# Patient Record
Sex: FEMALE | Race: WHITE | NOT HISPANIC OR LATINO | Employment: OTHER | URBAN - METROPOLITAN AREA
[De-identification: names, ages, dates, MRNs, and addresses within clinical notes are randomized per-mention and may not be internally consistent; named-entity substitution may affect disease eponyms.]

---

## 2017-05-15 ENCOUNTER — HISTORICAL (OUTPATIENT)
Dept: LAB | Facility: HOSPITAL | Age: 77
End: 2017-05-15

## 2017-05-15 LAB
ALBUMIN SERPL-MCNC: 3.6 GM/DL (ref 3.4–5)
ALBUMIN/GLOB SERPL: 0.9 RATIO (ref 1.1–2)
ALP SERPL-CCNC: 127 UNIT/L (ref 46–116)
ALT SERPL-CCNC: 26 UNIT/L (ref 12–78)
APPEARANCE, UA: CLEAR
AST SERPL-CCNC: 16 UNIT/L (ref 15–37)
BACTERIA #/AREA URNS AUTO: ABNORMAL /HPF
BILIRUB SERPL-MCNC: 0.5 MG/DL (ref 0.2–1)
BILIRUB UR QL STRIP: NEGATIVE
BILIRUBIN DIRECT+TOT PNL SERPL-MCNC: 0.13 MG/DL (ref 0–0.2)
BILIRUBIN DIRECT+TOT PNL SERPL-MCNC: 0.37 MG/DL (ref 0–0.8)
BUN SERPL-MCNC: 14 MG/DL (ref 7–18)
CALCIUM SERPL-MCNC: 8.9 MG/DL (ref 8.5–10.1)
CHLORIDE SERPL-SCNC: 105 MMOL/L (ref 98–107)
CHOLEST SERPL-MCNC: 226 MG/DL (ref 0–200)
CHOLEST/HDLC SERPL: 3.4 {RATIO} (ref 0–4)
CO2 SERPL-SCNC: 29.4 MMOL/L (ref 21–32)
COLOR UR: YELLOW
CREAT SERPL-MCNC: 0.68 MG/DL (ref 0.6–1.3)
ERYTHROCYTE [DISTWIDTH] IN BLOOD BY AUTOMATED COUNT: 15.1 % (ref 11.5–17)
GLOBULIN SER-MCNC: 4 GM/DL (ref 2.4–3.5)
GLUCOSE (UA): NEGATIVE
GLUCOSE SERPL-MCNC: 101 MG/DL (ref 74–106)
HCT VFR BLD AUTO: 40.8 % (ref 37–47)
HDLC SERPL-MCNC: 67 MG/DL (ref 40–60)
HGB BLD-MCNC: 13.4 GM/DL (ref 12–16)
HGB UR QL STRIP: NEGATIVE
KETONES UR QL STRIP: NEGATIVE
LDLC SERPL CALC-MCNC: 142 MG/DL (ref 0–129)
LEUKOCYTE ESTERASE UR QL STRIP: ABNORMAL
MCH RBC QN AUTO: 29 PG (ref 27–31)
MCHC RBC AUTO-ENTMCNC: 32.8 GM/DL (ref 33–36)
MCV RBC AUTO: 88.4 FL (ref 80–94)
NITRITE UR QL STRIP.AUTO: POSITIVE
PH UR STRIP: 8.5 [PH] (ref 5–9)
PLATELET # BLD AUTO: 402 X10(3)/MCL (ref 130–400)
PMV BLD AUTO: 7.9 FL (ref 7.4–10.4)
POTASSIUM SERPL-SCNC: 5 MMOL/L (ref 3.5–5.1)
PROT SERPL-MCNC: 7.6 GM/DL (ref 6.4–8.2)
PROT UR QL STRIP: NEGATIVE
RBC # BLD AUTO: 4.62 X10(6)/MCL (ref 4.2–5.4)
RBC #/AREA URNS HPF: ABNORMAL /[HPF]
SODIUM SERPL-SCNC: 141 MMOL/L (ref 136–145)
SP GR UR STRIP: 1.02 (ref 1–1.03)
SQUAMOUS EPITHELIAL, UA: ABNORMAL
TRIGL SERPL-MCNC: 83 MG/DL
TSH SERPL-ACNC: 1 MIU/ML (ref 0.36–3.74)
UROBILINOGEN UR STRIP-ACNC: 1
VLDLC SERPL CALC-MCNC: 17 MG/DL
WBC # SPEC AUTO: 11.8 X10(3)/MCL (ref 4.5–11.5)
WBC #/AREA URNS AUTO: ABNORMAL /HPF

## 2018-03-01 ENCOUNTER — HISTORICAL (OUTPATIENT)
Dept: RADIOLOGY | Facility: HOSPITAL | Age: 78
End: 2018-03-01

## 2018-03-01 LAB
ALBUMIN SERPL-MCNC: 3.3 GM/DL (ref 3.4–5)
ALBUMIN/GLOB SERPL: 0.8 RATIO (ref 1.1–2)
ALP SERPL-CCNC: 138 UNIT/L (ref 46–116)
ALT SERPL-CCNC: 24 UNIT/L (ref 12–78)
APPEARANCE, UA: ABNORMAL
AST SERPL-CCNC: 17 UNIT/L (ref 15–37)
BACTERIA #/AREA URNS AUTO: ABNORMAL /HPF
BILIRUB SERPL-MCNC: 0.5 MG/DL (ref 0.2–1)
BILIRUB UR QL STRIP: NEGATIVE
BILIRUBIN DIRECT+TOT PNL SERPL-MCNC: 0.15 MG/DL (ref 0–0.2)
BILIRUBIN DIRECT+TOT PNL SERPL-MCNC: 0.38 MG/DL (ref 0–0.8)
BUN SERPL-MCNC: 14.7 MG/DL (ref 7–18)
CALCIUM SERPL-MCNC: 9.1 MG/DL (ref 8.5–10.1)
CHLORIDE SERPL-SCNC: 105 MMOL/L (ref 98–107)
CHOLEST SERPL-MCNC: 157 MG/DL (ref 0–200)
CHOLEST/HDLC SERPL: 2.3 {RATIO} (ref 0–4)
CO2 SERPL-SCNC: 27.6 MMOL/L (ref 21–32)
COLOR UR: YELLOW
CREAT SERPL-MCNC: 0.71 MG/DL (ref 0.6–1.3)
GLOBULIN SER-MCNC: 3.9 GM/DL (ref 2.4–3.5)
GLUCOSE (UA): NEGATIVE
GLUCOSE SERPL-MCNC: 97 MG/DL (ref 74–106)
HDLC SERPL-MCNC: 68 MG/DL (ref 40–60)
HGB UR QL STRIP: ABNORMAL
KETONES UR QL STRIP: NEGATIVE
LDLC SERPL CALC-MCNC: 76 MG/DL (ref 0–129)
LEUKOCYTE ESTERASE UR QL STRIP: ABNORMAL
NITRITE UR QL STRIP.AUTO: POSITIVE
PH UR STRIP: 6 [PH] (ref 5–9)
POTASSIUM SERPL-SCNC: 3.9 MMOL/L (ref 3.5–5.1)
PROT SERPL-MCNC: 7.2 GM/DL (ref 6.4–8.2)
PROT UR QL STRIP: NEGATIVE
RBC #/AREA URNS HPF: ABNORMAL /[HPF]
SODIUM SERPL-SCNC: 142 MMOL/L (ref 136–145)
SP GR UR STRIP: 1.02 (ref 1–1.03)
SQUAMOUS EPITHELIAL, UA: ABNORMAL
TRIGL SERPL-MCNC: 66 MG/DL
UROBILINOGEN UR STRIP-ACNC: 1
VLDLC SERPL CALC-MCNC: 13 MG/DL
WBC #/AREA URNS AUTO: ABNORMAL /HPF

## 2018-04-17 ENCOUNTER — HISTORICAL (OUTPATIENT)
Dept: LAB | Facility: HOSPITAL | Age: 78
End: 2018-04-17

## 2018-04-17 LAB
APPEARANCE, UA: ABNORMAL
BACTERIA #/AREA URNS AUTO: ABNORMAL /HPF
BILIRUB UR QL STRIP: NEGATIVE
COLOR UR: YELLOW
GLUCOSE (UA): NEGATIVE
HGB UR QL STRIP: NEGATIVE
KETONES UR QL STRIP: NEGATIVE
LEUKOCYTE ESTERASE UR QL STRIP: NEGATIVE
NITRITE UR QL STRIP.AUTO: NEGATIVE
PH UR STRIP: 6 [PH] (ref 5–9)
PROT UR QL STRIP: NEGATIVE
RBC #/AREA URNS HPF: ABNORMAL /HPF
SP GR UR STRIP: 1.02 (ref 1–1.03)
SQUAMOUS EPITHELIAL, UA: ABNORMAL
UROBILINOGEN UR STRIP-ACNC: 0.2
WBC #/AREA URNS AUTO: ABNORMAL /HPF

## 2018-05-16 ENCOUNTER — HISTORICAL (OUTPATIENT)
Dept: ADMINISTRATIVE | Facility: HOSPITAL | Age: 78
End: 2018-05-16

## 2018-06-22 ENCOUNTER — HISTORICAL (OUTPATIENT)
Dept: ADMINISTRATIVE | Facility: HOSPITAL | Age: 78
End: 2018-06-22

## 2018-09-04 ENCOUNTER — HISTORICAL (OUTPATIENT)
Dept: LAB | Facility: HOSPITAL | Age: 78
End: 2018-09-04

## 2018-09-04 LAB
ALBUMIN SERPL-MCNC: 3.4 GM/DL (ref 3.4–5)
ALP SERPL-CCNC: 154 UNIT/L (ref 46–116)
ALT SERPL-CCNC: 44 UNIT/L (ref 12–78)
AST SERPL-CCNC: 26 UNIT/L (ref 15–37)
BILIRUB SERPL-MCNC: 0.4 MG/DL (ref 0.2–1)
BILIRUBIN DIRECT+TOT PNL SERPL-MCNC: 0.15 MG/DL (ref 0–0.2)
BILIRUBIN DIRECT+TOT PNL SERPL-MCNC: 0.25 MG/DL (ref 0–0.8)
CHOLEST SERPL-MCNC: 136 MG/DL (ref 0–200)
CHOLEST/HDLC SERPL: 2.5 {RATIO} (ref 0–4)
HDLC SERPL-MCNC: 54 MG/DL (ref 40–60)
LDLC SERPL CALC-MCNC: 59 MG/DL (ref 0–129)
PROT SERPL-MCNC: 7 GM/DL (ref 6.4–8.2)
TRIGL SERPL-MCNC: 114 MG/DL
VLDLC SERPL CALC-MCNC: 23 MG/DL

## 2018-11-09 ENCOUNTER — HOSPITAL ENCOUNTER (OUTPATIENT)
Dept: ADMINISTRATIVE | Facility: HOSPITAL | Age: 78
End: 2018-11-12
Attending: INTERNAL MEDICINE | Admitting: INTERNAL MEDICINE

## 2018-11-10 LAB
ABS NEUT (OLG): 5.92 X10(3)/MCL (ref 2.1–9.2)
ALBUMIN SERPL-MCNC: 3 GM/DL (ref 3.4–5)
ALBUMIN/GLOB SERPL: 0.8 {RATIO}
ALP SERPL-CCNC: 131 UNIT/L (ref 38–126)
ALT SERPL-CCNC: 43 UNIT/L (ref 12–78)
AMPHET UR QL SCN: ABNORMAL
APPEARANCE, UA: CLEAR
APTT PPP: 27.8 SECOND(S) (ref 24.8–36.9)
AST SERPL-CCNC: 46 UNIT/L (ref 15–37)
BACTERIA SPEC CULT: ABNORMAL /HPF
BARBITURATE SCN PRESENT UR: ABNORMAL
BENZODIAZ UR QL SCN: ABNORMAL
BILIRUB SERPL-MCNC: 0.3 MG/DL (ref 0.2–1)
BILIRUB UR QL STRIP: NEGATIVE
BILIRUBIN DIRECT+TOT PNL SERPL-MCNC: 0.1 MG/DL (ref 0–0.5)
BILIRUBIN DIRECT+TOT PNL SERPL-MCNC: 0.2 MG/DL (ref 0–0.8)
BNP BLD-MCNC: 24 PG/ML (ref 0–100)
BUN SERPL-MCNC: 12 MG/DL (ref 7–18)
CALCIUM SERPL-MCNC: 8.2 MG/DL (ref 8.5–10.1)
CANNABINOIDS UR QL SCN: ABNORMAL
CHLORIDE SERPL-SCNC: 106 MMOL/L (ref 98–107)
CK MB SERPL-MCNC: 2.6 NG/ML (ref 0.5–3.6)
CK MB SERPL-MCNC: 3.6 NG/ML (ref 0.5–3.6)
CK SERPL-CCNC: 70 UNIT/L (ref 26–192)
CK SERPL-CCNC: 90 UNIT/L (ref 26–192)
CO2 SERPL-SCNC: 27 MMOL/L (ref 21–32)
COCAINE UR QL SCN: ABNORMAL
COLOR UR: YELLOW
CREAT SERPL-MCNC: 0.78 MG/DL (ref 0.55–1.02)
EOSINOPHIL NFR BLD MANUAL: 16 % (ref 0–8)
ERYTHROCYTE [DISTWIDTH] IN BLOOD BY AUTOMATED COUNT: 13.9 % (ref 11.5–17)
ETHANOL SERPL-MCNC: <3 MG/DL
GLOBULIN SER-MCNC: 4 GM/DL (ref 2.4–3.5)
GLUCOSE (UA): NEGATIVE
GLUCOSE SERPL-MCNC: 103 MG/DL (ref 74–106)
HCT VFR BLD AUTO: 36.4 % (ref 37–47)
HGB BLD-MCNC: 11.4 GM/DL (ref 12–16)
HGB UR QL STRIP: NEGATIVE
INR PPP: 1.02 (ref 0–1.27)
KETONES UR QL STRIP: NEGATIVE
LEUKOCYTE ESTERASE UR QL STRIP: NEGATIVE
LYMPHOCYTES NFR BLD MANUAL: 38 % (ref 13–40)
MAGNESIUM SERPL-MCNC: 1.9 MG/DL (ref 1.8–2.4)
MAGNESIUM SERPL-MCNC: 2 MG/DL (ref 1.8–2.4)
MAGNESIUM SERPL-MCNC: 2 MG/DL (ref 1.8–2.4)
MCH RBC QN AUTO: 29.2 PG (ref 27–31)
MCHC RBC AUTO-ENTMCNC: 31.3 GM/DL (ref 33–36)
MCV RBC AUTO: 93.1 FL (ref 80–94)
MONOCYTES NFR BLD MANUAL: 6 % (ref 2–11)
NEUTROPHILS NFR BLD MANUAL: 40 % (ref 47–80)
NITRITE UR QL STRIP: POSITIVE
OPIATES UR QL SCN: ABNORMAL
PCP UR QL: ABNORMAL
PH UR STRIP.AUTO: 5 [PH] (ref 5–7.5)
PH UR STRIP: 5 [PH] (ref 5–9)
PHOSPHATE SERPL-MCNC: 2.8 MG/DL (ref 2.5–4.9)
PLATELET # BLD AUTO: 361 X10(3)/MCL (ref 130–400)
PLATELET # BLD EST: NORMAL 10*3/UL
PMV BLD AUTO: 9.7 FL (ref 7.4–10.4)
POTASSIUM SERPL-SCNC: 3.8 MMOL/L (ref 3.5–5.1)
PROT SERPL-MCNC: 7 GM/DL (ref 6.4–8.2)
PROT UR QL STRIP: NEGATIVE
PROTHROMBIN TIME: 13.7 SECOND(S) (ref 12.2–14.7)
RBC # BLD AUTO: 3.91 X10(6)/MCL (ref 4.2–5.4)
RBC #/AREA URNS HPF: ABNORMAL /[HPF]
SODIUM SERPL-SCNC: 138 MMOL/L (ref 136–145)
SP GR FLD REFRACTOMETRY: 1.08 (ref 1–1.03)
SP GR UR STRIP: >1.04 (ref 1–1.03)
SQUAMOUS EPITHELIAL, UA: ABNORMAL
TROPONIN I SERPL-MCNC: 0.09 NG/ML (ref 0.02–0.49)
TROPONIN I SERPL-MCNC: 0.11 NG/ML (ref 0.02–0.49)
TROPONIN I SERPL-MCNC: 0.12 NG/ML (ref 0.02–0.49)
UROBILINOGEN UR STRIP-ACNC: 1
WBC # SPEC AUTO: 13.4 X10(3)/MCL (ref 4.5–11.5)
WBC #/AREA URNS HPF: ABNORMAL /[HPF]

## 2018-11-11 LAB
ABS NEUT (OLG): 4.82 X10(3)/MCL (ref 2.1–9.2)
BASOPHILS # BLD AUTO: 0.1 X10(3)/MCL (ref 0–0.2)
BASOPHILS NFR BLD AUTO: 1 %
BUN SERPL-MCNC: 14 MG/DL (ref 7–18)
CALCIUM SERPL-MCNC: 8 MG/DL (ref 8.5–10.1)
CHLORIDE SERPL-SCNC: 110 MMOL/L (ref 98–107)
CO2 SERPL-SCNC: 27 MMOL/L (ref 21–32)
CREAT SERPL-MCNC: 0.74 MG/DL (ref 0.55–1.02)
CREAT/UREA NIT SERPL: 18.9
EOSINOPHIL # BLD AUTO: 2.5 X10(3)/MCL (ref 0–0.9)
EOSINOPHIL NFR BLD AUTO: 21 %
ERYTHROCYTE [DISTWIDTH] IN BLOOD BY AUTOMATED COUNT: 14.3 % (ref 11.5–17)
GLUCOSE SERPL-MCNC: 108 MG/DL (ref 74–106)
HCT VFR BLD AUTO: 33.4 % (ref 37–47)
HGB BLD-MCNC: 10.6 GM/DL (ref 12–16)
LYMPHOCYTES # BLD AUTO: 4 X10(3)/MCL (ref 0.6–4.6)
LYMPHOCYTES NFR BLD AUTO: 33 %
MCH RBC QN AUTO: 29.8 PG (ref 27–31)
MCHC RBC AUTO-ENTMCNC: 31.7 GM/DL (ref 33–36)
MCV RBC AUTO: 93.8 FL (ref 80–94)
MONOCYTES # BLD AUTO: 0.7 X10(3)/MCL (ref 0.1–1.3)
MONOCYTES NFR BLD AUTO: 6 %
NEUTROPHILS # BLD AUTO: 4.82 X10(3)/MCL (ref 2.1–9.2)
NEUTROPHILS NFR BLD AUTO: 40 %
PLATELET # BLD AUTO: 352 X10(3)/MCL (ref 130–400)
PMV BLD AUTO: 9.4 FL (ref 9.4–12.4)
POTASSIUM SERPL-SCNC: 3.8 MMOL/L (ref 3.5–5.1)
RBC # BLD AUTO: 3.56 X10(6)/MCL (ref 4.2–5.4)
SODIUM SERPL-SCNC: 143 MMOL/L (ref 136–145)
WBC # SPEC AUTO: 12.1 X10(3)/MCL (ref 4.5–11.5)

## 2018-11-16 LAB
FINAL CULTURE: NORMAL
FINAL CULTURE: NORMAL

## 2019-05-30 ENCOUNTER — HISTORICAL (OUTPATIENT)
Dept: LAB | Facility: HOSPITAL | Age: 79
End: 2019-05-30

## 2019-05-30 LAB
ALBUMIN SERPL-MCNC: 3.5 GM/DL (ref 3.4–5)
ALBUMIN/GLOB SERPL: 0.9 RATIO (ref 1.1–2)
ALP SERPL-CCNC: 127 UNIT/L (ref 46–116)
ALT SERPL-CCNC: 23 UNIT/L (ref 12–78)
APPEARANCE, UA: CLEAR
AST SERPL-CCNC: 16 UNIT/L (ref 15–37)
BACTERIA SPEC CULT: ABNORMAL
BILIRUB SERPL-MCNC: 0.5 MG/DL (ref 0.2–1)
BILIRUB UR QL STRIP: NEGATIVE
BILIRUBIN DIRECT+TOT PNL SERPL-MCNC: 0.17 MG/DL (ref 0–0.2)
BILIRUBIN DIRECT+TOT PNL SERPL-MCNC: 0.33 MG/DL (ref 0–0.8)
BUN SERPL-MCNC: 8.8 MG/DL (ref 7–18)
CALCIUM SERPL-MCNC: 9.1 MG/DL (ref 8.5–10.1)
CHLORIDE SERPL-SCNC: 103 MMOL/L (ref 98–107)
CHOLEST SERPL-MCNC: 200 MG/DL (ref 0–200)
CHOLEST/HDLC SERPL: 3.1 {RATIO} (ref 0–4)
CO2 SERPL-SCNC: 33.4 MMOL/L (ref 21–32)
COLOR UR: YELLOW
CREAT SERPL-MCNC: 0.74 MG/DL (ref 0.6–1.3)
ERYTHROCYTE [DISTWIDTH] IN BLOOD BY AUTOMATED COUNT: 14.4 % (ref 11.5–17)
GLOBULIN SER-MCNC: 3.9 GM/DL (ref 2.4–3.5)
GLUCOSE (UA): NEGATIVE
GLUCOSE SERPL-MCNC: 108 MG/DL (ref 74–106)
HCT VFR BLD AUTO: 37.4 % (ref 37–47)
HDLC SERPL-MCNC: 65 MG/DL (ref 40–60)
HGB BLD-MCNC: 12.7 GM/DL (ref 12–16)
HGB UR QL STRIP: ABNORMAL
KETONES UR QL STRIP: NEGATIVE
LDLC SERPL CALC-MCNC: 113 MG/DL (ref 0–129)
LEUKOCYTE ESTERASE UR QL STRIP: ABNORMAL
MCH RBC QN AUTO: 30.2 PG (ref 27–31)
MCHC RBC AUTO-ENTMCNC: 34 GM/DL (ref 33–36)
MCV RBC AUTO: 88.8 FL (ref 80–94)
NITRITE UR QL STRIP: POSITIVE
PH UR STRIP: 8.5 [PH] (ref 5–9)
PLATELET # BLD AUTO: 402 X10(3)/MCL (ref 130–400)
PMV BLD AUTO: 9.5 FL (ref 9.4–12.4)
POTASSIUM SERPL-SCNC: 3.3 MMOL/L (ref 3.5–5.1)
PROT SERPL-MCNC: 7.4 GM/DL (ref 6.4–8.2)
PROT UR QL STRIP: ABNORMAL
RBC # BLD AUTO: 4.21 X10(6)/MCL (ref 4.2–5.4)
RBC #/AREA URNS HPF: ABNORMAL /HPF
SODIUM SERPL-SCNC: 143 MMOL/L (ref 136–145)
SP GR UR STRIP: 1.01 (ref 1–1.03)
SQUAMOUS EPITHELIAL, UA: ABNORMAL
TRIGL SERPL-MCNC: 109 MG/DL
TSH SERPL-ACNC: 1.33 MIU/ML (ref 0.36–3.74)
UROBILINOGEN UR STRIP-ACNC: 1
VLDLC SERPL CALC-MCNC: 22 MG/DL
WBC # SPEC AUTO: 12.1 X10(3)/MCL (ref 4.5–11.5)
WBC #/AREA URNS HPF: ABNORMAL /HPF

## 2020-05-28 ENCOUNTER — HISTORICAL (OUTPATIENT)
Dept: RADIOLOGY | Facility: HOSPITAL | Age: 80
End: 2020-05-28

## 2020-05-28 LAB
ABS NEUT (OLG): 6.17 X10(3)/MCL (ref 2.1–9.2)
ALBUMIN SERPL-MCNC: 3.6 GM/DL (ref 3.4–5)
ALBUMIN/GLOB SERPL: 0.9 RATIO (ref 1.1–2)
ALP SERPL-CCNC: 139 UNIT/L (ref 46–116)
ALT SERPL-CCNC: 16 UNIT/L (ref 12–78)
APPEARANCE, UA: ABNORMAL
AST SERPL-CCNC: 16 UNIT/L (ref 15–37)
BACTERIA SPEC CULT: ABNORMAL
BASOPHILS # BLD AUTO: 0 X10(3)/MCL (ref 0–0.2)
BASOPHILS NFR BLD AUTO: 0 %
BILIRUB SERPL-MCNC: 0.4 MG/DL (ref 0.2–1)
BILIRUB UR QL STRIP: NEGATIVE
BILIRUBIN DIRECT+TOT PNL SERPL-MCNC: 0.12 MG/DL (ref 0–0.2)
BILIRUBIN DIRECT+TOT PNL SERPL-MCNC: 0.28 MG/DL (ref 0–0.8)
BUN SERPL-MCNC: 11.6 MG/DL (ref 7–18)
CALCIUM SERPL-MCNC: 9.2 MG/DL (ref 8.5–10.1)
CHLORIDE SERPL-SCNC: 106 MMOL/L (ref 98–107)
CHOLEST SERPL-MCNC: 182 MG/DL (ref 0–200)
CHOLEST/HDLC SERPL: 2.9 {RATIO} (ref 0–4)
CO2 SERPL-SCNC: 29.3 MMOL/L (ref 21–32)
COLOR UR: YELLOW
CREAT SERPL-MCNC: 0.79 MG/DL (ref 0.6–1.3)
EOSINOPHIL # BLD AUTO: 1.9 X10(3)/MCL (ref 0–0.9)
EOSINOPHIL NFR BLD AUTO: 15 %
ERYTHROCYTE [DISTWIDTH] IN BLOOD BY AUTOMATED COUNT: 13.6 % (ref 11.5–17)
GLOBULIN SER-MCNC: 4.1 GM/DL (ref 2.4–3.5)
GLUCOSE (UA): NEGATIVE
GLUCOSE SERPL-MCNC: 104 MG/DL (ref 74–106)
HCT VFR BLD AUTO: 39.3 % (ref 37–47)
HDLC SERPL-MCNC: 63 MG/DL (ref 40–60)
HGB BLD-MCNC: 12.7 GM/DL (ref 12–16)
HGB UR QL STRIP: ABNORMAL
IMM GRANULOCYTES # BLD AUTO: 0.01 % (ref 0–0.02)
IMM GRANULOCYTES NFR BLD AUTO: 0.1 % (ref 0–0.43)
KETONES UR QL STRIP: NEGATIVE
LDLC SERPL CALC-MCNC: 94 MG/DL (ref 0–129)
LEUKOCYTE ESTERASE UR QL STRIP: NEGATIVE
LYMPHOCYTES # BLD AUTO: 3.8 X10(3)/MCL (ref 0.6–4.6)
LYMPHOCYTES NFR BLD AUTO: 30 %
MCH RBC QN AUTO: 29.6 PG (ref 27–31)
MCHC RBC AUTO-ENTMCNC: 32.3 GM/DL (ref 33–36)
MCV RBC AUTO: 91.6 FL (ref 80–94)
MONOCYTES # BLD AUTO: 0.7 X10(3)/MCL (ref 0.1–1.3)
MONOCYTES NFR BLD AUTO: 6 %
NEUTROPHILS # BLD AUTO: 6.17 X10(3)/MCL (ref 1.4–7.9)
NEUTROPHILS NFR BLD AUTO: 49 %
NITRITE UR QL STRIP: POSITIVE
PH UR STRIP: 5 [PH] (ref 5–9)
PLATELET # BLD AUTO: 425 X10(3)/MCL (ref 130–400)
PMV BLD AUTO: 10 FL (ref 9.4–12.4)
POTASSIUM SERPL-SCNC: 4.7 MMOL/L (ref 3.5–5.1)
PROT SERPL-MCNC: 7.7 GM/DL (ref 6.4–8.2)
PROT UR QL STRIP: NEGATIVE
RBC # BLD AUTO: 4.29 X10(6)/MCL (ref 4.2–5.4)
RBC #/AREA URNS HPF: ABNORMAL /[HPF]
SODIUM SERPL-SCNC: 144 MMOL/L (ref 136–145)
SP GR UR STRIP: >=1.03 (ref 1–1.03)
SQUAMOUS EPITHELIAL, UA: ABNORMAL
TRIGL SERPL-MCNC: 123 MG/DL
TSH SERPL-ACNC: 1.04 MIU/ML (ref 0.36–3.74)
UROBILINOGEN UR STRIP-ACNC: 0.2
VLDLC SERPL CALC-MCNC: 25 MG/DL
WBC # SPEC AUTO: 12.6 X10(3)/MCL (ref 4.5–11.5)
WBC #/AREA URNS HPF: ABNORMAL /HPF

## 2021-05-26 ENCOUNTER — HISTORICAL (OUTPATIENT)
Dept: RADIOLOGY | Facility: HOSPITAL | Age: 81
End: 2021-05-26

## 2021-06-15 ENCOUNTER — HISTORICAL (OUTPATIENT)
Dept: LAB | Facility: HOSPITAL | Age: 81
End: 2021-06-15

## 2021-06-15 LAB
ALBUMIN SERPL-MCNC: 3.8 GM/DL (ref 3.4–4.8)
ALBUMIN/GLOB SERPL: 1 RATIO (ref 1.1–2)
ALP SERPL-CCNC: 130 UNIT/L (ref 40–150)
ALT SERPL-CCNC: 18 UNIT/L (ref 0–55)
AST SERPL-CCNC: 17 UNIT/L (ref 5–34)
BILIRUB SERPL-MCNC: 0.8 MG/DL
BILIRUBIN DIRECT+TOT PNL SERPL-MCNC: 0.3 MG/DL (ref 0–0.5)
BILIRUBIN DIRECT+TOT PNL SERPL-MCNC: 0.5 MG/DL (ref 0–0.8)
BUN SERPL-MCNC: 11.8 MG/DL (ref 9.8–20.1)
CALCIUM SERPL-MCNC: 9 MG/DL (ref 8.4–10.2)
CHLORIDE SERPL-SCNC: 104 MMOL/L (ref 98–107)
CHOLEST SERPL-MCNC: 200 MG/DL
CHOLEST/HDLC SERPL: 4 {RATIO} (ref 0–5)
CO2 SERPL-SCNC: 27 MMOL/L (ref 23–31)
CREAT SERPL-MCNC: 0.71 MG/DL (ref 0.55–1.02)
ERYTHROCYTE [DISTWIDTH] IN BLOOD BY AUTOMATED COUNT: 13.6 % (ref 11.5–17)
GLOBULIN SER-MCNC: 4 GM/DL (ref 2.4–3.5)
GLUCOSE SERPL-MCNC: 93 MG/DL (ref 82–115)
HCT VFR BLD AUTO: 41 % (ref 37–47)
HDLC SERPL-MCNC: 57 MG/DL (ref 35–60)
HGB BLD-MCNC: 13.1 GM/DL (ref 12–16)
LDLC SERPL CALC-MCNC: 117 MG/DL (ref 50–140)
MCH RBC QN AUTO: 29.1 PG (ref 27–31)
MCHC RBC AUTO-ENTMCNC: 32 GM/DL (ref 33–36)
MCV RBC AUTO: 91.1 FL (ref 80–94)
PLATELET # BLD AUTO: 440 X10(3)/MCL (ref 130–400)
PMV BLD AUTO: 9.3 FL (ref 9.4–12.4)
POTASSIUM SERPL-SCNC: 3.7 MMOL/L (ref 3.5–5.1)
PROT SERPL-MCNC: 7.8 GM/DL (ref 5.8–7.6)
RBC # BLD AUTO: 4.5 X10(6)/MCL (ref 4.2–5.4)
SODIUM SERPL-SCNC: 142 MMOL/L (ref 136–145)
TRIGL SERPL-MCNC: 132 MG/DL (ref 37–140)
TSH SERPL-ACNC: 1.34 UIU/ML (ref 0.35–4.94)
VLDLC SERPL CALC-MCNC: 26 MG/DL
WBC # SPEC AUTO: 11.2 X10(3)/MCL (ref 4.5–11.5)

## 2021-06-16 ENCOUNTER — HISTORICAL (OUTPATIENT)
Dept: LAB | Facility: HOSPITAL | Age: 81
End: 2021-06-16

## 2021-06-16 LAB
APPEARANCE, UA: ABNORMAL
BACTERIA SPEC CULT: ABNORMAL
BILIRUB UR QL STRIP: NEGATIVE
COLOR UR: YELLOW
GLUCOSE (UA): NEGATIVE
HGB UR QL STRIP: ABNORMAL
KETONES UR QL STRIP: NEGATIVE
LEUKOCYTE ESTERASE UR QL STRIP: ABNORMAL
NITRITE UR QL STRIP: POSITIVE
PH UR STRIP: 6 [PH] (ref 5–9)
PROT UR QL STRIP: NEGATIVE
RBC #/AREA URNS HPF: ABNORMAL /[HPF]
SP GR UR STRIP: 1.02 (ref 1–1.03)
SQUAMOUS EPITHELIAL, UA: ABNORMAL
UROBILINOGEN UR STRIP-ACNC: 1
WBC #/AREA URNS HPF: ABNORMAL /HPF

## 2022-04-10 ENCOUNTER — HISTORICAL (OUTPATIENT)
Dept: ADMINISTRATIVE | Facility: HOSPITAL | Age: 82
End: 2022-04-10
Payer: MEDICARE

## 2022-04-21 ENCOUNTER — HISTORICAL (OUTPATIENT)
Dept: LAB | Facility: HOSPITAL | Age: 82
End: 2022-04-21
Payer: MEDICARE

## 2022-04-21 LAB
ABS NEUT (OLG): 9.71 (ref 2.1–9.2)
ALBUMIN SERPL-MCNC: 3.4 G/DL (ref 3.4–4.8)
ALBUMIN/GLOB SERPL: 0.9 {RATIO} (ref 1.1–2)
ALP SERPL-CCNC: 136 U/L (ref 40–150)
ALT SERPL-CCNC: 8 U/L (ref 0–55)
AST SERPL-CCNC: 8 U/L (ref 5–34)
BASOPHILS # BLD AUTO: 0 10*3/UL (ref 0–0.2)
BASOPHILS NFR BLD AUTO: 0 %
BILIRUB SERPL-MCNC: 0.4 MG/DL
BILIRUBIN DIRECT+TOT PNL SERPL-MCNC: 0.2 (ref 0–0.5)
BILIRUBIN DIRECT+TOT PNL SERPL-MCNC: 0.2 (ref 0–0.8)
BUN SERPL-MCNC: 18.1 MG/DL (ref 9.8–20.1)
CALCIUM SERPL-MCNC: 8.9 MG/DL (ref 8.7–10.5)
CHLORIDE SERPL-SCNC: 108 MMOL/L (ref 98–107)
CO2 SERPL-SCNC: 22 MMOL/L (ref 23–31)
CREAT SERPL-MCNC: 1.01 MG/DL (ref 0.55–1.02)
EOSINOPHIL # BLD AUTO: 0.8 10*3/UL (ref 0–0.9)
EOSINOPHIL NFR BLD AUTO: 6 %
ERYTHROCYTE [DISTWIDTH] IN BLOOD BY AUTOMATED COUNT: 14.2 % (ref 11.5–17)
FLUAV AG UPPER RESP QL IA.RAPID: NEGATIVE
FLUBV AG UPPER RESP QL IA.RAPID: NEGATIVE
GLOBULIN SER-MCNC: 3.7 G/DL (ref 2.4–3.5)
GLUCOSE SERPL-MCNC: 223 MG/DL (ref 82–115)
HCT VFR BLD AUTO: 40.7 % (ref 37–47)
HEMOLYSIS INTERF INDEX SERPL-ACNC: 3
HGB BLD-MCNC: 12.8 G/DL (ref 12–16)
ICTERIC INTERF INDEX SERPL-ACNC: 0
LIPEMIC INTERF INDEX SERPL-ACNC: 12
LYMPHOCYTES # BLD AUTO: 2.2 10*3/UL (ref 0.6–4.6)
LYMPHOCYTES NFR BLD AUTO: 16 %
MANUAL DIFF? (OHS): NO
MCH RBC QN AUTO: 28.4 PG (ref 27–31)
MCHC RBC AUTO-ENTMCNC: 31.4 G/DL (ref 33–36)
MCV RBC AUTO: 90.4 FL (ref 80–94)
MONOCYTES # BLD AUTO: 0.5 10*3/UL (ref 0.1–1.3)
MONOCYTES NFR BLD AUTO: 4 %
NEUTROPHILS # BLD AUTO: 9.71 10*3/UL (ref 1.4–7.9)
NEUTROPHILS NFR BLD AUTO: 73 %
PLATELET # BLD AUTO: 452 10*3/UL (ref 130–400)
PMV BLD AUTO: 9.4 FL (ref 9.4–12.4)
POTASSIUM SERPL-SCNC: 4 MMOL/L (ref 3.5–5.1)
PROT SERPL-MCNC: 7.1 G/DL (ref 5.8–7.6)
RBC # BLD AUTO: 4.5 10*6/UL (ref 4.2–5.4)
SARS-COV-2 RNA RESP QL NAA+PROBE: NEGATIVE
SODIUM SERPL-SCNC: 140 MMOL/L (ref 136–145)
WBC # SPEC AUTO: 13.3 10*3/UL (ref 4.5–11.5)

## 2022-04-29 VITALS
DIASTOLIC BLOOD PRESSURE: 72 MMHG | SYSTOLIC BLOOD PRESSURE: 134 MMHG | BODY MASS INDEX: 34.03 KG/M2 | WEIGHT: 184.94 LBS | HEIGHT: 62 IN

## 2022-04-30 NOTE — OP NOTE
Patient:   Any Caldwell            MRN: [id number]            FIN: [id number]               Age:   78 years     Sex:  Female     :  1940   Associated Diagnoses:   None   Author:   Og Nguyen MD      Preoperative Diagnosis: Cataract Right eye    Postoperative Diagnosis: Cataract Right eye    Procedure: Phacoemulsification with intaocular lens implantations Right eye    Surgeon: Og Nguyen MD    Assistant: Jeaneth Jones, [facility]    Anestheisa: Topical    Complications: None    The patient was brought into the operating suite, where the patient was correctly identified as was the operative eye via timeout.  The patient was prepped and draped in a sterile ophthalmic fashion.  A lid speculum was placed in the operative eye and the microscope was brought into place.  A 1.0mm paracentesis was then made at (12) o'clock.  The anterior chamber was filled with Endocoat.  A (temporal) clear corneal incision was made with a 2.4 mm keratome.  A 6 mm corneal marking ring was used to jarrod the cornea centered over the visual axis.  A 5.00 mm continuous curvilinear capsulorhexis was fashioned using a cystotome and microcapsular forceps.  Hydrodissection and hydrodelineation was performed with upreserved 1% Xylocaine.  The nucleus was then phacoemulsified with the Abbott phacoemulsification hand-piece with a total of (12) EFX.  The cortex was then removed with the Simeon I/A hand-piece. An MAGGY lens model (ZCB00) with a power of (22.5) was placed in the capsular bag.  The Helon was then removed from the eye with the Simeon I/A hand piece.  The anterior chamber was inflated and the wounds were hydrated with BSS.  The wounds were checked with Weck-Kayla sponges and found to be watertight.  The lid speculum was removed and topical antibiotics were placed on the operative eye.  The patient was brought to PACU in good condition.      Surgery Date 18 [facility]

## 2022-04-30 NOTE — DISCHARGE SUMMARY
Patient:   Any Caldwell            MRN: [id number]            FIN: [id number]               Age:   78 years     Sex:  Female     :  1940   Associated Diagnoses:   Anxiety; Back pain; E-coli UTI; General weakness; HLD (hyperlipidemia); Hyperlipidemia; Obesity; Osteoporosis; Syncope; Syncope/Near syncope   Author:   Bonnie Kimble MD      Results Review   General results   Most recent results   Discrete results only   2018 3:45 CST      WBC                       12.1 x10(3)/mcL  HI                             RBC                       3.56 x10(6)/mcL  LOW                             Hgb                       10.6 gm/dL  LOW                             Hct                       33.4 %  LOW                             Platelet                  352 x10(3)/mcL                             MCV                       93.8 fL                             MCH                       29.8 pg                             MCHC                      31.7 gm/dL  LOW                             RDW                       14.3 %                             MPV                       9.4 fL                             Abs Neut                  4.82 x10(3)/mcL                             Neutro Auto               40 %  NA                             Lymph Auto                33 %  NA                             Mono Auto                 6 %  NA                             Eos Auto                  21 %  NA                             Abs Eos                   2.5 x10(3)/mcL  HI                             Basophil Auto             1 %  NA                             Abs Neutro                4.82 x10(3)/mcL                             Abs Lymph                 4.0 x10(3)/mcL                             Abs Mono                  0.7 x10(3)/mcL                             Abs Baso                  0.1 x10(3)/mcL                             Sodium Lvl                143 mmol/L                             Potassium Lvl              3.8 mmol/L                             Chloride                  110 mmol/L  HI                             CO2                       27.0 mmol/L                             Calcium Lvl               8.0 mg/dL  LOW                             Glucose Lvl               108 mg/dL  HI                             BUN                       14.0 mg/dL                             Creatinine                0.74 mg/dL                             BUN/Creat Ratio           18.9  NA                             eGFR-AA                   >60 mL/min/1.73 m2  NA                             eGFR-SAPPHIRE                  >60 mL/min/1.73 m2  NA           Discharge Information   Discharge Summary Information:  Admitted  11/10/2018, Discharged  11/12/2018.         Admitting physician: Bruno KEYS, Mitchell DEL VALLE         Referring physician for admission: Melissa KEYS, Tony MONTENEGRO.         Discharge diagnosis: Anxiety (WCK55-KM F41.9), Back pain (EJQ16-UL M54.9), E-coli UTI (KZB63-ZF N39.0), General weakness (EJQ40-JP R53.1), HLD (hyperlipidemia) (QTJ67-RL E78.5), Hyperlipidemia (OLE17-OC E78.5), Obesity (WFY01-DI E66.9), Osteoporosis (TDQ90-LG M81.0), Syncope (UWD68-AN R55), Syncope (JLO56-MH R55), Syncope/Near syncope (PNED [id number]).       Physical Examination   Vital Signs   11/12/2018 10:38 CST     Temperature Oral          36.7 DegC                             Temperature Oral (calculated)             98.06 DegF                             Peripheral Pulse Rate     76 bpm                             Heart Rate Monitored      79 bpm                             Respiratory Rate          16 br/min                             SpO2                      95 %                             Oxygen Therapy            Room air                             Systolic Blood Pressure   144 mmHg  HI                             Diastolic Blood Pressure  82 mmHg                             Mean Arterial Pressure, Cuff               103 mmHg    11/12/2018 8:00 CST      Heart Rate Monitored      79 bpm     General: Alert and oriented, No acute distress.   Eye: Pupils are equal, round and reactive to light,   HENT: Normocephalic,   Neck: Supple.   Respiratory: Lungs are clear to auscultation, Respirations are non-labored.   Cardiovascular: Normal rate, Regular rhythm, No edema.   Gastrointestinal: Soft, Non-tender, Normal bowel sounds  Integumentary: Warm,   Neurologic: Alert, Oriented, no focal deficits  Psychiatric: Cooperative,            Hospital Course   Hospital Course   Admitted from: from emergency department.     Admitting diagnosis: Anxiety (SAS28-PM F41.9), Back pain (AFY45-OV M54.9), E-coli UTI (PRC43-OZ N39.0), General weakness (TXW35-ZI R53.1), HLD (hyperlipidemia) (OPU63-KI E78.5), Hyperlipidemia (AGT52-ZE E78.5), Obesity (RUX60-ZO E66.9), Osteoporosis (ALI70-AZ M81.0), Syncope (YWC88-PX R55), Syncope (PZH16-UL R55), Syncope/Near syncope (PNED [id number]).     Admission disposition: admit to monitored bed.     Length of stay: days  3.     Medical management.     78-year-old female with significant history of osteoporosis, anxiety, HLD, dementia, CVA admitted to hospitalist service on 11/10/2018 with a syncopal episode. Patient seemed to be incoherent for almost 30 minutes. Was mumbling and was not responsive. Patient was emergently brought to the ED. Was hypotensive on initial evaluation by EMS, later resolved spontaneously. Labs pretty unremarkable except for mild leukocytosis. UA positive for UTI. Patient was admitted to hospitalist service and was initiated on Levaquin. EEG ordered given concerns for possible seizure even though less likely. Urine cultures ordered. Given concerns for syncope, CT head, ultrasound carotid and echocardiogram was also ordered. Patient symptomatically stable as of 11/10.After hospital admission she remained on ceftriaxone for UTI pending cultures.  All workup for syncope  came back negative.  Ultrasound carotid, echocardiogram, CT head and EEG negative.  Most likely cause of symptom was UTI combined with possible polypharmacy.  Patient was recently prescribed tizanidine, Zyprexa, Zoloft, amitriptyline along with gabapentin by PCP.  Patient reports taking one dose of Zyprexa before this episode.  Home medications continued as appropriate.  Once final cultures were available and patient was hemodynamically and symptomatically stable it was decided to discharge the patient back home with home health and PT.  Doxycycline to complete a 7 day course for UTI.  Discharge medications per med rec.  All treatment plans discussed with the patient and also family members and they voiced understanding.  Amitriptyline, Zyprexa, and tizanidine discontinued.  Continue Zoloft.  Diclofenac when necessary.  Continue statins, donepezil, gabapentin and alendronate.  Follow-up with primary care physician in one week.  Discharged in stable condition.         Discharge Plan   Discharge Summary Plan   Discharge Status: improved.     Discharge instructions given: to patient, to family member daughter.     Discharge disposition: discharge to home with home health care.     Prescriptions: per med rec.     Course   Improving.     Progressing as expected.     Well controlled.     Education and Follow-up   Counseled: patient.     Discharge Planning: Syncope, Easy-to-Read, Urinary Tract Infection, Adult, Easy-to-Read, [facility] to resume [phone]; Marquise Blair 11/15/2018 11:00:00; Anytime the conditions worsen, return to clinic or go to ED.     DC Time was 36 mnts

## 2022-04-30 NOTE — OP NOTE
Patient:   Any Caldwell            MRN: [id number]            FIN: [id number]               Age:   78 years     Sex:  Female     :  1940   Associated Diagnoses:   None   Author:   Og Nguyen MD      Preoperative Diagnosis: Cataract Left eye    Postoperative Diagnosis: Cataract Left eye    Procedure: Phacoemulsification with intaocular lens implantations Left eye    Surgeon: Og Nguyen MD    Assistant: LORELEI Lee     Anestheisa: Topical    Complications: None    The patient was brought into the operating suite, where the patient was correctly identified as was the operative eye via timeout.  The patient was prepped and draped in a sterile ophthalmic fashion.  A lid speculum was placed in the operative eye and the microscope was brought into place.  A 1.0mm paracentesis was then made at 6 o'clock.  The anterior chamber was filled with Endocoat.  A temporal clear corneal incision was made with a 2.4 mm keratome.  A 6 mm corneal marking ring was used to jarrod the cornea centered over the visual axis.  A 5.00 mm continuous curvilinear capsulorhexis was fashioned using a cystotome and microcapsular forceps.  Hydrodissection and hydrodelineation was performed with upreserved 1% Xylocaine.  The nucleus was then phacoemulsified with the Abbott phacoemulsification hand-piece with a total of 6 EFX.  The cortex was then removed with the Simeon I/A hand-piece. An MAGGY lens model ZCB00 with a power of 23.0 was placed in the capsular bag.  The Helon was then removed from the eye with the Simeon I/A hand piece.  The anterior chamber was inflated and the wounds were hydrated with BSS.  The wounds were checked with Weck-Kayla sponges and found to be watertight.  The lid speculum was removed and topical antibiotics were placed on the operative eye.  The patient was brought to PACU in good condition.        2018 @ [facility]

## 2022-04-30 NOTE — H&P
"   Patient:   Any Caldwell            MRN: [id number]            FIN: [id number]               Age:   78 years     Sex:  Female     :  1940   Associated Diagnoses:   Syncope; Osteoporosis; Obesity; Hyperlipidemia; Back pain   Author:   Mitchell Carr MD      Chief Complaint   2018 23:31 CST      Pt arrived via airmed after syncopal episode around 2200. family reported that pt became cyanotic around lip and pale. Pt is AAOx4, GCS 15. inital pressure 90s/50s with ems        History of Present Illness   This patient who has had a prior CVA earlier this year was laying on her sofa when last seen by her family and suddenly became incoherent for about 30 minutes.  She was mumbling and not responsive.  Her family does live with her in her home.  There was no obvious evidence of tongue biting but possible urinary incontinence.  Her systolic blood pressure was 112 in the emergency room. Pt was lying on couch when she "passed out".,  EMS reports family said she was pale and was hypotensive,  no CP or sob.  No prior episodes.  No sz like activity.  Pt is on multiple potentially sedating medications and psych meds but no new meds according to her..     The family and the patient are poor historians         Review of Systems   Constitutional:  Fatigue.    Eye:  Negative.    Ear/Nose/Mouth/Throat:  Negative.    Respiratory:  Negative.    Cardiovascular:  Negative.    Gastrointestinal:  Negative.    Genitourinary:  Negative.    Hematology/Lymphatics:  Negative.    Endocrine:  Negative.    Immunologic:  Negative.    Musculoskeletal:  Back pain, Joint pain.    Integumentary:  Negative.    Neurologic:  Negative except as documented in history of present illness.    Psychiatric:  Depression.       Health Status   Allergies:    Allergies (1) Active Reaction  No Known Allergies None Documented   Current medications:  (Selected)   Documented Medications  Documented  ALENDRONATE SODIUM 70 MG TABS: 70 mg = 1 tab(s), " Oral, qWeek  AMITRIPTYLINE HCL 25 MG TABS: 25 mg = 1 tab(s), Oral, qPM  AMITRIPTYLINE HCL 50 MG TABS: 50 mg = 1 tab(s), Oral, qPM  ATORVASTATIN CALCIUM 20 MG TABS: 40 mg = 2 tab(s), Oral, Daily  DICLOFENAC SODIUM DR 50 MG TBEC: 50 mg = 1 tab(s), Oral, BID  DONEPEZIL HCL 5 MG TABS: 5 mg = 1 tab(s), Oral, Daily  Nitrostat 0.4 mg sublingual tablet: 0.4 mg = 1 tab(s), SL, q5min, PRN PRN as needed for chest pain, # 100 tab(s), 0 Refill(s)  OFLOXACIN .3 % SOLN: 1 drop(s), Eye-Left, QID  PREDNISOLONE ACETATE 1 % SUSP:   SERTRALINE  MG TABS: 100 mg = 1 tab(s), Oral, Daily  TIZANIDINE HCL 4 MG TABS: 4 mg = 1 tab(s), Oral, qPM  Zyprexa 2.5 mg oral tablet: 5 mg = 2 tab(s), Oral, Daily, 0 Refill(s)  gabapentin 300 mg oral capsule: 300 mg = 1 cap(s), Oral, Once a day (at bedtime), 0 Refill(s)  multivitamin with minerals (Adult Tab): 1 tab(s), Oral, Daily, # 30 tab(s), 0 Refill(s)   Problem list:    Active Problems (8)  Acute ischemic left middle cerebral artery (MCA) stroke   Arthritis   Back pain   Cataracts   Hyperlipidemia   Obesity   Osteoporosis   Varicose vein       Histories   Family History:    Alzheimer's disease  Sister  Polio  Brother  Lung cancer.  Mother     Procedure history:    42929 - RT CATARACT W/IOL 1 STA PHACO (Right) on 6/22/2018 at 78 Years.  Comments:  6/22/2018 09:54 - Abida May RN  auto-populated from documented surgical case  72306 - LT CATARACT W/IOL 1 STA PHACO (Left) on 5/16/2018 at 78 Years.  Comments:  5/16/2018 08:21 - Diana Pettit RN  auto-populated from documented surgical case  Hysterectomy ([id number]) in 1988 at 48 Years.  Colonoscopy ([id number]).  Angiogram ([id number]).   Social History        Social & Psychosocial Habits    Alcohol  05/08/2018  Use: Past    Substance Abuse  05/04/2018  Use: Never    Tobacco  05/04/2018  Use: Never smoker.        Physical Examination      Vital Signs (last 24 hrs)_____  Last Charted___________  Temp Oral     36.5 DegC  (NOV 09  23:31)  Heart Rate Peripheral   68 bpm  (NOV 10 04:23)  Resp Rate         20 br/min  (NOV 10 04:23)  SBP      112 mmHg  (NOV 10 04:23)  DBP      88 mmHg  (NOV 10 04:23)  SpO2      96 %  (NOV 10 04:23)       General:  Very drowsy , no acute distress, overall tired appearing , conversant earlier.    Lynn coma scale:  Total score: Total score: 15.   Neurological:  Alert and oriented to person, place, time, and situation, No focal neurological deficit observed, CN II-XII intact, normal sensory observed, normal motor observed, normal speech observed, normal coordination observed.    Skin:  Warm, dry, intact.    Head:  Normocephalic, atraumatic.    Neck:  Supple, trachea midline.    Eye:  Pupils are equal, round and reactive to light, extraocular movements are intact, normal conjunctiva.    Ears, nose, mouth and throat:  Oral mucosa moist.   Cardiovascular:  Regular rate and rhythm, No murmur, Normal peripheral perfusion, No edema.    Respiratory:  Lungs are clear to auscultation, respirations are non-labored, breath sounds are equal, Symmetrical chest wall expansion.    Chest wall:  No tenderness.   Musculoskeletal:  Normal ROM, normal strength, no tenderness, no swelling, no deformity.    Gastrointestinal:  Soft, Nontender, Non distended.    Psychiatric:  Cooperative, appropriate mood & affect         Review / Management   Results review:     Labs (Last four charted values)  WBC                  H 13.4 (NOV 10)   Hgb                  L 11.4 (NOV 10)   Hct                  L 36.4 (NOV 10)   Plt                  361 (NOV 10)   Na                   138 (NOV 10)   K                    3.8 (NOV 10)   CO2                  27.0 (NOV 10)   Cl                   106 (NOV 10)   Cr                   0.78 (NOV 10)   BUN                  12.0 (NOV 10)   Glucose Random       103 (NOV 10)   PT                   13.7 (NOV 10)   INR                  1.02 (NOV 10)   PTT                  27.8 (NOV 10) .    Laboratory Results   Last 5  Days Lab Results : PowerNote Discrete Results   11/10/2018 0:44 CST      POC BNP iSTAT             24 pg/mL    11/10/2018 0:11 CST      WBC                       13.4 x10(3)/mcL  HI                             RBC                       3.91 x10(6)/mcL  LOW                             Hgb                       11.4 gm/dL  LOW                             Hct                       36.4 %  LOW                             Platelet                  361 x10(3)/mcL                             MCV                       93.1 fL                             MCH                       29.2 pg                             MCHC                      31.3 gm/dL  LOW                             RDW                       13.9 %                             MPV                       9.7 fL                             Abs Neut                  5.92 x10(3)/mcL                             Neut Man                  40 %  LOW                             Lymph Man                 38 %                             Monocyte Man              6 %                             Eos Man                   16 %  HI                             Platelet Est              Normal                             PT                        13.7 second(s)                             INR                       1.02                             PTT                       27.8 second(s)                             Sodium Lvl                138 mmol/L                             Potassium Lvl             3.8 mmol/L                             Chloride                  106 mmol/L                             CO2                       27.0 mmol/L                             Calcium Lvl               8.2 mg/dL  LOW                             Magnesium Lvl             2.0 mg/dL                             Glucose Lvl               103 mg/dL                             BUN                       12.0 mg/dL                             Creatinine                0.78 mg/dL                              eGFR-AA                   >60 mL/min/1.73 m2  NA                             eGFR-SAPPHIRE                  >60 mL/min/1.73 m2  NA                             Bili Total                0.3 mg/dL                             Bili Direct               0.10 mg/dL                             Bili Indirect             0.20 mg/dL                             AST                       46 unit/L  HI                             ALT                       43 unit/L                             Alk Phos                  131 unit/L  HI                             Total Protein             7.0 gm/dL                             Albumin Lvl               3.00 gm/dL  LOW                             Globulin                  4.00 gm/dL  HI                             A/G Ratio                 0.8  NA                             Phosphorus                2.8 mg/dL                             Troponin-I                0.12 ng/mL                             Ethanol Lvl               <3.0 mg/dL  NA        Condition:  Fair.    CT chest with PE protocol reveals no evidence of pulmonary embolism      Impression and Plan   Diagnosis     Syncope (TXP02-WT R55).     Osteoporosis (GGL87-EV M81.0).     Obesity (ZDW41-IQ E66.9).     Hyperlipidemia (AXN08-LL E78.5).     Back pain (TBY24-WS M54.9).     PLAN:  Elevated white blood cell count  Hypotension  Obtain blood and urine cultures  Place patient empirically on antibiotic Levaquin  Electroencephalogram to be obtained because she has a hx of CVA  lovenox to prevent DVT   IV fluids   Start Midrin to help with her low blood pressure but hold Midrin if the systolic blood pressures greater than 120  In the morning consider orthostatic vitals

## 2022-04-30 NOTE — ED PROVIDER NOTES
"   Patient:   Any Caldwell            MRN: [id number]            FIN: [id number]               Age:   78 years     Sex:  Female     :  1940   Associated Diagnoses:   Syncope   Author:   Melissa KEYS, Tony MONTENEGRO      Basic Information   Time seen: Immediately upon arrival.   History source: Patient, family, EMS.   Arrival mode: Air ambulance.   History limitation: None.      History of Present Illness   The patient presents with syncope and 77 yo wf c/o syncopal episode at home with no prodrome.  Pt was lying on couch when she "passed out" for a brief time,  EMS reports family said she was pale and was hypotensive,  no CP or sob.  No prior episodes.  No sz like activity.  Pt is on multiple potentially sedating medications and psych meds but no new meds according to her..        Review of Systems   Constitutional symptoms:  Negative except as documented in HPI.   Skin symptoms   ENMT symptoms:  Negative except as documented in HPI.   Respiratory symptoms:  Negative except as documented in HPI.   Cardiovascular symptoms:  Negative except as documented in HPI.   Gastrointestinal symptoms:  Negative except as documented in HPI.   Musculoskeletal symptoms:  Negative except as documented in HPI.   Neurologic symptoms:  Negative except as documented in HPI.             Additional review of systems information: All other systems reviewed and otherwise negative.      Health Status   Allergies: No known allergies.   Medications:  (Selected)   Inpatient Medications  Pending Complete  Ofloxacin: 1, form: Drops, Eye-Right, q5min, order duration: 3 dose(s), first dose 18 8:35:00 CDT, stop date 18 8:49:00 CDT, POM - Patient Own Antibiotic Drop (POM)  Prolensa: 1, form: Injection, Eye-Right, q5min, order duration: 3 dose(s), first dose 18 8:35:00 CDT, stop date 18 8:49:00 CDT, Patient own NSAID drop (POM)  Documented Medications  Documented  ALENDRONATE SODIUM 70 MG TABS: 70 mg = 1 tab(s), Oral, " qWeek  AMITRIPTYLINE HCL 25 MG TABS: 25 mg = 1 tab(s), Oral, qPM  AMITRIPTYLINE HCL 50 MG TABS: 50 mg = 1 tab(s), Oral, qPM  ATORVASTATIN CALCIUM 20 MG TABS: 40 mg = 2 tab(s), Oral, Daily  Rebecca Back and Body 500mg-32.5mg: Rebecca Back and Body 500mg-32.5mg, 2tab, Oral, BID, 0 Refill(s)  DICLOFENAC SODIUM DR 50 MG TBEC: 50 mg = 1 tab(s), Oral, BID  DONEPEZIL HCL 5 MG TABS: 5 mg = 1 tab(s), Oral, Daily  Nitrostat 0.4 mg sublingual tablet: 0.4 mg = 1 tab(s), SL, q5min, PRN PRN as needed for chest pain, # 100 tab(s), 0 Refill(s)  OFLOXACIN .3 % SOLN: 1 drop(s), Eye-Left, QID  PREDNISOLONE ACETATE 1 % SUSP:   SERTRALINE  MG TABS: 100 mg = 1 tab(s), Oral, Daily  TIZANIDINE HCL 4 MG TABS: 4 mg = 1 tab(s), Oral, qPM  Zyprexa 2.5 mg oral tablet: 5 mg = 2 tab(s), Oral, Daily, 0 Refill(s)  clindamycin 300 mg oral capsule: 300 mg = 1 cap(s), Oral, q6hr, # 28 cap(s), 0 Refill(s)  gabapentin 300 mg oral capsule: 300 mg = 1 cap(s), Oral, Once a day (at bedtime), 0 Refill(s)  glucosamine 500 mg oral capsule: 500 mg = 1 cap(s), Oral, Daily, 0 Refill(s)  multivitamin with minerals (Adult Tab): 1 tab(s), Oral, Daily, # 30 tab(s), 0 Refill(s).      Past Medical/ Family/ Social History   Surgical history:    79576 - RT CATARACT W/IOL 1 STA PHACO (Right) on 6/22/2018 at 78 Years.  Comments:  6/22/2018 09:54 - Yadira RUIZ, Abida PRICE  auto-populated from documented surgical case  [id number] - LT CATARACT W/IOL 1 STA PHACO (Left) on 5/16/2018 at 78 Years.  Comments:  5/16/2018 08:21 - Wilver RUIZ, Diana SPIVEY  auto-populated from documented surgical case  Hysterectomy ([id number]) in 1988 at 48 Years.  Colonoscopy ([id number]).  Angiogram ([id number])..   Family history:    Alzheimer's disease  Sister  Polio  Brother  Lung cancer.  Mother  .   Social history:    Social & Psychosocial Habits    Alcohol  05/08/2018  Use: Past    Substance Abuse  05/04/2018  Use: Never    Tobacco  05/04/2018  Use: Never smoker, Alcohol use: Denies, Tobacco use:  Denies, Drug use: Denies.      Physical Examination               Vital Signs   Vital Signs   11/9/2018 23:31 CST      Temperature Oral          36.5 DegC                             Temperature Oral (calculated)             97.70 DegF                             Peripheral Pulse Rate     64 bpm                             SpO2                      99 %                             Oxygen Therapy            Room air                             Systolic Blood Pressure   135 mmHg                             Diastolic Blood Pressure  65 mmHg  .   Measurements   11/9/2018 23:31 CST      Weight Dosing             82 kg                             Weight Measured and Calculated in Lbs     180.78 lb                             Weight Estimated          82 kg  .   Basic Oxygen Information   11/9/2018 23:47 CST      SpO2                      98 %                             Oxygen Therapy            Room air  .   General:  Alert, no acute distress, well appearing, conversant, well appearing, conversant.    Christopher coma scale:  Total score: Total score: 15.   Neurological:  Alert and oriented to person, place, time, and situation, No focal neurological deficit observed, CN II-XII intact, normal sensory observed, normal motor observed, normal speech observed, normal coordination observed.    Skin:  Warm, dry, intact.    Head:  Normocephalic, atraumatic.    Neck:  Supple, trachea midline.    Eye:  Pupils are equal, round and reactive to light, extraocular movements are intact, normal conjunctiva.    Ears, nose, mouth and throat:  Oral mucosa moist.   Cardiovascular:  Regular rate and rhythm, No murmur, Normal peripheral perfusion, No edema.    Respiratory:  Lungs are clear to auscultation, respirations are non-labored, breath sounds are equal, Symmetrical chest wall expansion.    Chest wall:  No tenderness.   Musculoskeletal:  Normal ROM, normal strength, no tenderness, no swelling, no deformity.    Gastrointestinal:  Soft,  Nontender, Non distended.    Psychiatric:  Cooperative, appropriate mood & affect, normal judgment.       Medical Decision Making   Documents reviewed:  Emergency department nurses' notes.   Orders  Launch Order Profile (Selected)   Inpatient Orders  Ordered  Cardiac Monitorin18 23:55:00 CST, Constant Order  Discharge Planning Ongoing Assessment: 18 9:00:00 CST, q3day  Fall Risk Protocol: 18 23:41:41 CST, Constant Order  Orthostatic Vital Signs: 18 23:55:00 CST, Stop date 18 23:55:00 CST  Ordered (Collected)  POC BNP iSTAT request: BLOOD, STAT collect, Collected, 11/10/18 0:11:05 CST, Stop date 11/10/18 0:11:00 CST, Lab Collect, Print Label By Order Location  Ordered (Dispatched)  Drug Screen Urine: Now collect, Urine, 18 23:55:00 CST, Stop date 18 23:56:00 CST, Nurse collect, Print Label By Order Location, 18 23:55:00 CST  Urinalysis with microscopic a reflex to culture: Stat collect, Urine, 18 23:55:00 CST, Stop date 18 23:56:00 CST, Nurse collect, Print Label By Order Location  Canceled  Automated Diff: STAT collect, 11/10/18 0:11:00 CST, Blood, Collected, Stop date 11/10/18 0:11:00 CST, Lab Collect, 18 23:55:00 CST  Completed  CBC w/ Auto Diff: STAT collect, 11/10/18 0:11:05 CST, BLOOD, Collected, Stop date 11/10/18 0:11:00 CST, Lab Collect  CMP: STAT collect, 11/10/18 0:11:05 CST, BLOOD, Collected, Stop date 11/10/18 0:11:00 CST, Lab Collect  Estimated Glomerular Filtration Rate: STAT collect, 11/10/18 0:11:00 CST, Blood, Collected, Stop date 11/10/18 0:11:00 CST, Lab Collect, Print Label By Order Location, 18 23:55:00 CST  Ethanol Level: STAT collect, 11/10/18 0:11:05 CST, BLOOD, Collected, Stop date 11/10/18 0:11:00 CST, Lab Collect  Magnesium Level: STAT collect, 11/10/18 0:11:05 CST, BLOOD, Collected, Stop date 11/10/18 0:11:00 CST, Lab Collect  Manual Diff: STAT collect, 11/10/18 0:11:00 CST, Blood, Collected, Stop date 11/10/18  0:11:00 CST, Lab Collect, 11/09/18 23:55:00 CST  POC BNP iSTAT: Blood, Stat collect, Collected, 11/10/18 0:44:46 CST  PTT: STAT collect, 11/10/18 0:11:05 CST, BLOOD, Collected, Stop date 11/10/18 0:11:00 CST, Lab Collect  Phosphorus Level: STAT collect, 11/10/18 0:11:05 CST, BLOOD, Collected, Stop date 11/10/18 0:11:00 CST, Lab Collect  Prothrombin Time: STAT collect, 11/10/18 0:11:05 CST, BLOOD, Collected, Stop date 11/10/18 0:11:00 CST, Lab Collect  Troponin-I: STAT collect, 11/10/18 0:11:05 CST, BLOOD, Collected, Stop date 11/10/18 0:11:00 CST, Lab Collect.   Electrocardiogram:  Time 11/10/2018 23:41:00, rate 61, normal sinus rhythm, No ST-T changes, no ectopy, normal MN & QRS intervals, EP Interp.    Results review:  Lab results : Lab View   11/10/2018 0:44 CST      POC BNP iSTAT             24 pg/mL    11/10/2018 0:11 CST      Sodium Lvl                138 mmol/L                             Potassium Lvl             3.8 mmol/L                             Chloride                  106 mmol/L                             CO2                       27.0 mmol/L                             Calcium Lvl               8.2 mg/dL  LOW                             Magnesium Lvl             2.0 mg/dL                             Glucose Lvl               103 mg/dL                             BUN                       12.0 mg/dL                             Creatinine                0.78 mg/dL                             eGFR-AA                   >60 mL/min/1.73 m2  NA                             eGFR-SAPPHIRE                  >60 mL/min/1.73 m2  NA                             Bili Total                0.3 mg/dL                             Bili Direct               0.10 mg/dL                             Bili Indirect             0.20 mg/dL                             AST                       46 unit/L  HI                             ALT                       43 unit/L                             Alk Phos                  131 unit/L   HI                             Total Protein             7.0 gm/dL                             Albumin Lvl               3.00 gm/dL  LOW                             Globulin                  4.00 gm/dL  HI                             A/G Ratio                 0.8  NA                             Phosphorus                2.8 mg/dL                             Troponin-I                0.12 ng/mL                             PT                        13.7 second(s)                             INR                       1.02                             PTT                       27.8 second(s)                             WBC                       13.4 x10(3)/mcL  HI                             RBC                       3.91 x10(6)/mcL  LOW                             Hgb                       11.4 gm/dL  LOW                             Hct                       36.4 %  LOW                             Platelet                  361 x10(3)/mcL                             MCV                       93.1 fL                             MCH                       29.2 pg                             MCHC                      31.3 gm/dL  LOW                             RDW                       13.9 %                             MPV                       9.7 fL                             Abs Neut                  5.92 x10(3)/mcL                             Neut Man                  40 %  LOW                             Lymph Man                 38 %                             Monocyte Man              6 %                             Eos Man                   16 %  HI                             Platelet Est              Normal                             Ethanol Lvl               <3.0 mg/dL  NA  .   Radiology results:  Reviewed radiologist's report, ct chest neg for PE, mild dilation of upper lt kidney - ? hydronephrosis  .       Impression and Plan   Diagnosis   Syncope (CES18-FL R55)   Plan   Condition: Stable.    Disposition:  Discharged: Time  11/10/2018 04:32:00, to home.    Counseled: Patient, Family, Regarding diagnosis, Regarding diagnostic results, Regarding treatment plan, Patient indicated understanding of instructions.    Notes: I, Sophie Verduzco, acted solely as a scribe for and in the presence of Dr. Torres who performed the service.   .       Addendum      Teaching-Supervisory Addendum-Brief   Notes: I, Dr. Torres, personally performed the services described in this documentation as scribed in my presence and it is both accurate and complete..

## 2023-02-27 ENCOUNTER — HOSPITAL ENCOUNTER (OUTPATIENT)
Dept: RADIOLOGY | Facility: HOSPITAL | Age: 83
Discharge: HOME | End: 2023-02-27
Attending: FAMILY MEDICINE
Payer: MEDICARE

## 2023-02-27 DIAGNOSIS — M25.519 PAIN IN JOINT, SHOULDER REGION: Primary | ICD-10-CM

## 2023-02-27 DIAGNOSIS — M25.519 PAIN IN JOINT, SHOULDER REGION: ICD-10-CM

## 2023-02-27 PROCEDURE — 73030 X-RAY EXAM OF SHOULDER: CPT | Mod: TC,LT

## 2023-03-01 ENCOUNTER — APPOINTMENT (OUTPATIENT)
Dept: LAB | Facility: HOSPITAL | Age: 83
End: 2023-03-01
Attending: FAMILY MEDICINE
Payer: MEDICARE

## 2023-03-01 DIAGNOSIS — F45.8 ANXIETY HYPERVENTILATION: ICD-10-CM

## 2023-03-01 DIAGNOSIS — J30.9 SPASMODIC RHINORRHEA: Primary | ICD-10-CM

## 2023-03-01 DIAGNOSIS — R03.0 ELEVATED BLOOD PRESSURE READING WITHOUT DIAGNOSIS OF HYPERTENSION: ICD-10-CM

## 2023-03-01 DIAGNOSIS — F41.9 ANXIETY HYPERVENTILATION: ICD-10-CM

## 2023-03-01 LAB
APPEARANCE UR: CLEAR
BACTERIA #/AREA URNS AUTO: ABNORMAL /HPF
BILIRUB UR QL STRIP.AUTO: NEGATIVE MG/DL
COLOR UR AUTO: YELLOW
GLUCOSE UR QL STRIP.AUTO: NEGATIVE MG/DL
KETONES UR QL STRIP.AUTO: NEGATIVE MG/DL
LEUKOCYTE ESTERASE UR QL STRIP.AUTO: ABNORMAL UNIT/L
NITRITE UR QL STRIP.AUTO: POSITIVE
PH UR STRIP.AUTO: 5 [PH]
PROT UR QL STRIP.AUTO: NEGATIVE MG/DL
RBC #/AREA URNS AUTO: ABNORMAL /HPF
RBC UR QL AUTO: NEGATIVE UNIT/L
SP GR UR STRIP.AUTO: 1.02
SQUAMOUS #/AREA URNS AUTO: ABNORMAL /HPF
UROBILINOGEN UR STRIP-ACNC: 0.2 MG/DL
WBC #/AREA URNS AUTO: ABNORMAL /HPF

## 2023-03-01 PROCEDURE — 87088 URINE BACTERIA CULTURE: CPT

## 2023-03-01 PROCEDURE — 87077 CULTURE AEROBIC IDENTIFY: CPT

## 2023-03-01 PROCEDURE — 81001 URINALYSIS AUTO W/SCOPE: CPT

## 2023-03-03 LAB — BACTERIA UR CULT: ABNORMAL

## 2023-03-10 DIAGNOSIS — R51.9 HEAD ACHE: Primary | ICD-10-CM

## 2023-06-11 ENCOUNTER — HOSPITAL ENCOUNTER (EMERGENCY)
Facility: HOSPITAL | Age: 83
Discharge: HOME | End: 2023-06-12
Attending: FAMILY MEDICINE
Payer: MEDICARE

## 2023-06-11 DIAGNOSIS — R07.9 CHEST PAIN: ICD-10-CM

## 2023-06-11 DIAGNOSIS — N39.0 URINARY TRACT INFECTION WITHOUT HEMATURIA, SITE UNSPECIFIED: Primary | ICD-10-CM

## 2023-06-11 PROCEDURE — 93010 ELECTROCARDIOGRAM REPORT: CPT | Mod: ,,, | Performed by: INTERNAL MEDICINE

## 2023-06-11 PROCEDURE — 93010 EKG 12-LEAD: ICD-10-PCS | Mod: ,,, | Performed by: INTERNAL MEDICINE

## 2023-06-11 PROCEDURE — 99285 EMERGENCY DEPT VISIT HI MDM: CPT | Mod: 25

## 2023-06-11 PROCEDURE — 93005 ELECTROCARDIOGRAM TRACING: CPT

## 2023-06-12 VITALS
HEART RATE: 60 BPM | BODY MASS INDEX: 35.88 KG/M2 | SYSTOLIC BLOOD PRESSURE: 212 MMHG | OXYGEN SATURATION: 98 % | TEMPERATURE: 97 F | WEIGHT: 195 LBS | HEIGHT: 62 IN | DIASTOLIC BLOOD PRESSURE: 58 MMHG | RESPIRATION RATE: 20 BRPM

## 2023-06-12 LAB
ALBUMIN SERPL-MCNC: 2.9 G/DL (ref 3.4–4.8)
ALBUMIN/GLOB SERPL: 0.9 RATIO (ref 1.1–2)
ALP SERPL-CCNC: 100 UNIT/L (ref 40–150)
ALT SERPL-CCNC: 7 UNIT/L (ref 0–55)
APPEARANCE UR: ABNORMAL
AST SERPL-CCNC: 11 UNIT/L (ref 5–34)
BACTERIA #/AREA URNS AUTO: ABNORMAL /HPF
BASOPHILS # BLD AUTO: 0.02 X10(3)/MCL
BASOPHILS NFR BLD AUTO: 0.2 %
BILIRUB UR QL STRIP.AUTO: ABNORMAL MG/DL
BILIRUBIN DIRECT+TOT PNL SERPL-MCNC: 0.3 MG/DL
BUN SERPL-MCNC: 13.6 MG/DL (ref 9.8–20.1)
CALCIUM SERPL-MCNC: 8.2 MG/DL (ref 8.4–10.2)
CHLORIDE SERPL-SCNC: 111 MMOL/L (ref 98–107)
CO2 SERPL-SCNC: 21 MMOL/L (ref 23–31)
COLOR UR: ABNORMAL
CREAT SERPL-MCNC: 0.77 MG/DL (ref 0.55–1.02)
EOSINOPHIL # BLD AUTO: 1.15 X10(3)/MCL (ref 0–0.9)
EOSINOPHIL NFR BLD AUTO: 10.4 %
ERYTHROCYTE [DISTWIDTH] IN BLOOD BY AUTOMATED COUNT: 13.9 % (ref 11.5–17)
FLUAV AG UPPER RESP QL IA.RAPID: NOT DETECTED
FLUBV AG UPPER RESP QL IA.RAPID: NOT DETECTED
GFR SERPLBLD CREATININE-BSD FMLA CKD-EPI: >60 MLS/MIN/1.73/M2
GLOBULIN SER-MCNC: 3.2 GM/DL (ref 2.4–3.5)
GLUCOSE SERPL-MCNC: 156 MG/DL (ref 82–115)
GLUCOSE UR QL STRIP.AUTO: NEGATIVE MG/DL
HCT VFR BLD AUTO: 35.2 % (ref 37–47)
HGB BLD-MCNC: 10.9 G/DL (ref 12–16)
HYALINE CASTS URNS QL MICRO: ABNORMAL /LPF
IMM GRANULOCYTES # BLD AUTO: 0.01 X10(3)/MCL (ref 0–0.04)
IMM GRANULOCYTES NFR BLD AUTO: 0.1 %
KETONES UR QL STRIP.AUTO: 15 MG/DL
LEUKOCYTE ESTERASE UR QL STRIP.AUTO: ABNORMAL UNIT/L
LYMPHOCYTES # BLD AUTO: 2.41 X10(3)/MCL (ref 0.6–4.6)
LYMPHOCYTES NFR BLD AUTO: 21.8 %
MCH RBC QN AUTO: 29 PG (ref 27–31)
MCHC RBC AUTO-ENTMCNC: 31 G/DL (ref 33–36)
MCV RBC AUTO: 93.6 FL (ref 80–94)
MONOCYTES # BLD AUTO: 0.66 X10(3)/MCL (ref 0.1–1.3)
MONOCYTES NFR BLD AUTO: 6 %
NEUTROPHILS # BLD AUTO: 6.78 X10(3)/MCL (ref 2.1–9.2)
NEUTROPHILS NFR BLD AUTO: 61.5 %
NITRITE UR QL STRIP.AUTO: POSITIVE
PH UR STRIP.AUTO: 5 [PH]
PLATELET # BLD AUTO: 365 X10(3)/MCL (ref 130–400)
PMV BLD AUTO: 9.3 FL (ref 7.4–10.4)
POCT GLUCOSE: 150 MG/DL (ref 70–110)
POTASSIUM SERPL-SCNC: 3.6 MMOL/L (ref 3.5–5.1)
PROT SERPL-MCNC: 6.1 GM/DL (ref 5.8–7.6)
PROT UR QL STRIP.AUTO: 30 MG/DL
RBC # BLD AUTO: 3.76 X10(6)/MCL (ref 4.2–5.4)
RBC #/AREA URNS AUTO: ABNORMAL /HPF
RBC UR QL AUTO: ABNORMAL UNIT/L
SARS-COV-2 RNA RESP QL NAA+PROBE: NOT DETECTED
SODIUM SERPL-SCNC: 141 MMOL/L (ref 136–145)
SP GR UR STRIP.AUTO: >=1.03
SQUAMOUS #/AREA URNS AUTO: ABNORMAL /HPF
UROBILINOGEN UR STRIP-ACNC: 1 MG/DL
WBC # SPEC AUTO: 11.03 X10(3)/MCL (ref 4.5–11.5)
WBC #/AREA URNS AUTO: ABNORMAL /HPF

## 2023-06-12 PROCEDURE — 0240U COVID/FLU A&B PCR: CPT | Performed by: FAMILY MEDICINE

## 2023-06-12 PROCEDURE — 85025 COMPLETE CBC W/AUTO DIFF WBC: CPT | Performed by: FAMILY MEDICINE

## 2023-06-12 PROCEDURE — 81001 URINALYSIS AUTO W/SCOPE: CPT | Performed by: FAMILY MEDICINE

## 2023-06-12 PROCEDURE — 87088 URINE BACTERIA CULTURE: CPT | Performed by: FAMILY MEDICINE

## 2023-06-12 PROCEDURE — 96360 HYDRATION IV INFUSION INIT: CPT

## 2023-06-12 PROCEDURE — 87077 CULTURE AEROBIC IDENTIFY: CPT | Performed by: FAMILY MEDICINE

## 2023-06-12 PROCEDURE — 80053 COMPREHEN METABOLIC PANEL: CPT | Performed by: FAMILY MEDICINE

## 2023-06-12 PROCEDURE — 25000003 PHARM REV CODE 250: Performed by: FAMILY MEDICINE

## 2023-06-12 PROCEDURE — 82962 GLUCOSE BLOOD TEST: CPT

## 2023-06-12 RX ORDER — NITROFURANTOIN 25; 75 MG/1; MG/1
100 CAPSULE ORAL 2 TIMES DAILY
Qty: 14 CAPSULE | Refills: 0 | Status: SHIPPED | OUTPATIENT
Start: 2023-06-12 | End: 2023-06-19

## 2023-06-12 RX ORDER — NITROFURANTOIN 25; 75 MG/1; MG/1
100 CAPSULE ORAL
Status: COMPLETED | OUTPATIENT
Start: 2023-06-12 | End: 2023-06-12

## 2023-06-12 RX ADMIN — SODIUM CHLORIDE 500 ML: 9 INJECTION, SOLUTION INTRAVENOUS at 12:06

## 2023-06-12 RX ADMIN — NITROFURANTOIN MONOHYDRATE/MACROCRYSTALS 100 MG: 25; 75 CAPSULE ORAL at 02:06

## 2023-06-12 NOTE — ED PROVIDER NOTES
Encounter Date: 6/11/2023       History     Chief Complaint   Patient presents with    Fatigue     Sudden weakness about 2 hours ago. States she began to stutter and had to have someone pick her up and place her into  wheelchair.      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  No past surgical history on file.  No family history on file.     Review of Systems   Constitutional:  Negative for fever.   HENT:  Negative for sore throat.    Respiratory:  Negative for shortness of breath.    Cardiovascular:  Negative for chest pain.   Gastrointestinal:  Negative for nausea.   Genitourinary:  Negative for dysuria.   Musculoskeletal:  Negative for back pain.   Skin:  Negative for rash.   Neurological:  Positive for speech difficulty. Negative for weakness.   Hematological:  Does not bruise/bleed easily.     Physical Exam     Initial Vitals [06/12/23 0002]   BP Pulse Resp Temp SpO2   (!) 97/57 65 18 97.2 °F (36.2 °C) (!) 94 %      MAP       --         Physical Exam    Nursing note and vitals reviewed.  Constitutional: She appears well-developed.   HENT:   Head: Normocephalic and atraumatic.   Right Ear: External ear normal.   Left Ear: External ear normal.   Nose: Nose normal.   Mouth/Throat: Oropharynx is clear and moist. No oropharyngeal exudate.   Eyes: Conjunctivae and EOM are normal. Pupils are equal, round, and reactive to light. Right eye exhibits no discharge. Left eye exhibits no discharge.   Neck: Neck supple. No tracheal deviation present. No JVD present.   Normal range of motion.  Cardiovascular:  Normal rate, regular rhythm, normal heart sounds  and intact distal pulses.     Exam reveals no gallop and no friction rub.       No murmur heard.  Pulmonary/Chest: Breath sounds normal. No stridor. No respiratory distress. She has no wheezes. She has no rhonchi. She has no rales.   Abdominal: Abdomen is soft. Bowel sounds are normal. She exhibits no distension and no mass. There is no abdominal tenderness. There is no rebound and no guarding.   Musculoskeletal:         General: Normal range of motion.      Cervical back: Normal range of motion and neck supple.     Neurological: She is alert and oriented to person, place, and time. She has normal strength. No cranial nerve deficit.   Skin: Skin is warm and dry. No rash and no abscess noted. No erythema.   Psychiatric: She has a normal mood and affect. Her behavior is normal. Judgment and thought content normal.       ED Course   Procedures  Labs Reviewed   COMPREHENSIVE METABOLIC PANEL - Abnormal; Notable for the following components:       Result Value    Chloride 111 (*)     Carbon Dioxide 21 (*)     Glucose Level 156 (*)     Calcium Level Total 8.2 (*)     Albumin Level 2.9 (*)     Albumin/Globulin Ratio 0.9 (*)     All other components within normal limits   URINALYSIS, REFLEX TO URINE CULTURE - Abnormal; Notable for the following components:    Appearance, UA Slightly Cloudy (*)     Protein, UA 30 (*)     Ketones, UA 15 (*)     Blood, UA Trace-Intact (*)     Bilirubin, UA Small (*)     Nitrites, UA Positive (*)     Leukocyte Esterase, UA Small (*)     All other components within normal limits   CBC WITH DIFFERENTIAL - Abnormal; Notable for the following components:    RBC 3.76 (*)     Hgb 10.9 (*)     Hct 35.2 (*)     MCHC 31.0 (*)     Eos # 1.15 (*)     All other components within normal limits   URINALYSIS, MICROSCOPIC - Abnormal; Notable for the following components:    Bacteria, UA 2+ (*)     Hyaline Casts, UA Moderate (*)     WBC, UA 21-50 (*)     All other components within normal limits   COVID/FLU A&B  PCR - Normal    Narrative:     The Xpert Xpress SARS-CoV-2/FLU/RSV plus is a rapid, multiplexed real-time PCR test intended for the simultaneous qualitative detection and differentiation of SARS-CoV-2, Influenza A, Influenza B, and respiratory syncytial virus (RSV) viral RNA in either nasopharyngeal swab or nasal swab specimens.         CULTURE, URINE   CBC W/ AUTO DIFFERENTIAL    Narrative:     The following orders were created for panel order CBC auto differential.  Procedure                               Abnormality         Status                     ---------                               -----------         ------                     CBC with Differential[[id number]]        Abnormal            Final result                 Please view results for these tests on the individual orders.        ECG Results              EKG 12-lead (In process)  Result time 06/12/23 01:20:40      In process by Interface, Lab In [facility] (06/12/23 01:20:40)                   Narrative:    Test Reason : R07.9,    Vent. Rate : 066 BPM     Atrial Rate : 066 BPM     P-R Int : 218 ms          QRS Dur : 080 ms      QT Int : 470 ms       P-R-T Axes : 025 009 040 degrees     QTc Int : 492 ms    Sinus rhythm with 1st degree A-V block  Low voltage QRS  Prolonged QT  Abnormal ECG  No previous ECGs available    Referred By: [email or web address]   SELF           Confirmed By:                                   Imaging Results              X-Ray Chest AP Portable (In process)                      CT Head Without Contrast (Preliminary result)  Result time 06/12/23 00:40:36      Preliminary result by Carlito Coates MD (06/12/23 00:40:36)                   Narrative:    START OF REPORT:  Technique: CT of the head was performed without intravenous contrast with axial as well as coronal and sagittal images.    Comparison: Comparison is with study dated 2021-08-11 15:22:50.    Dosage Information: Automated Exposure Control was utilized 1498.27 mGy.cm.    Clinical  history: Tired, Fatigue (Sudden weakness about 2 hours ago. States she began to stutter and had to have someone pick her up and place her into wheelchair. ).    Findings:  Hemorrhage: No acute intracranial hemorrhage is seen.  CSF spaces: The ventricles, sulci and basal cisterns all appear moderately prominent consistent with global cerebral atrophy.  Brain parenchyma: Moderate microvascular change is seen in portions of the periventricular and deep white matter tracts.  Cerebellum: Unremarkable.  Vascular: Unremarkable venous sinuses.  Sella and skull base: The sella appears to be within normal limits for age.  Cerebellopontine angles: Within normal limits.  Intracranial calcifications: Incidental note is made of bilateral choroid plexus calcification. Incidental note is made of some pineal region calcification.  Calvarium: No acute linear or depressed skull fracture is seen.    Maxillofacial Structures:  Paranasal sinuses: The visualized paranasal sinuses appear clear with no mucoperiosteal thickening or air fluid levels identified.  Orbits: The orbits appear unremarkable.  Zygomatic arches: The zygomatic arches are intact and unremarkable.  Temporal bones and mastoids: The temporal bones and mastoids appear unremarkable.  TMJ: The mandibular condyles appear normally placed with respect to the mandibular fossa.  Nasal Bones: The nasal septum is midline. No displaced nasal bone fracture is seen.    Visualized upper cervical spine: The visualized cervical spine appears unremarkable.      Impression:  1. No acute intracranial process identified. Details and other findings as noted above.                                         Medications   sodium chloride 0.9% bolus 500 mL 500 mL (0 mLs Intravenous Stopped 6/12/23 0130)     Medical Decision Making:   Differential Diagnosis:   Weakness stroke CVA TIA hypoglycemia                        Clinical Impression:   Final diagnoses:  [R07.9] Chest pain  [N39.0] Urinary tract  infection without hematuria, site unspecified (Primary)        ED Disposition Condition    Discharge Stable          ED Prescriptions       Medication Sig Dispense Start Date End Date Auth. Provider    nitrofurantoin, macrocrystal-monohydrate, (MACROBID) 100 MG capsule Take 1 capsule (100 mg total) by mouth 2 (two) times daily. for 7 days 14 capsule 6/12/2023 6/19/2023 Jim Sin MD          Follow-up Information    None          Jim Sin MD  06/12/23 [id number]

## 2023-06-14 LAB — BACTERIA UR CULT: ABNORMAL

## 2024-03-01 DIAGNOSIS — M25.512 LEFT SHOULDER PAIN: Primary | ICD-10-CM

## 2024-03-29 ENCOUNTER — HOSPITAL ENCOUNTER (EMERGENCY)
Facility: HOSPITAL | Age: 84
Discharge: HOME | End: 2024-03-29
Attending: STUDENT IN AN ORGANIZED HEALTH CARE EDUCATION/TRAINING PROGRAM
Payer: COMMERCIAL

## 2024-03-29 VITALS
HEART RATE: 93 BPM | WEIGHT: 195 LBS | TEMPERATURE: 98 F | DIASTOLIC BLOOD PRESSURE: 79 MMHG | RESPIRATION RATE: 18 BRPM | SYSTOLIC BLOOD PRESSURE: 145 MMHG | BODY MASS INDEX: 35.88 KG/M2 | OXYGEN SATURATION: 98 % | HEIGHT: 62 IN

## 2024-03-29 DIAGNOSIS — S31.829A WOUND OF LEFT BUTTOCK, INITIAL ENCOUNTER: Primary | ICD-10-CM

## 2024-03-29 PROCEDURE — 99284 EMERGENCY DEPT VISIT MOD MDM: CPT

## 2024-03-29 RX ORDER — MUPIROCIN 20 MG/G
OINTMENT TOPICAL 3 TIMES DAILY
Qty: 1 G | Refills: 0 | Status: SHIPPED | OUTPATIENT
Start: 2024-03-29

## 2024-03-29 RX ORDER — CLINDAMYCIN HYDROCHLORIDE 150 MG/1
300 CAPSULE ORAL 4 TIMES DAILY
Qty: 56 CAPSULE | Refills: 0 | Status: SHIPPED | OUTPATIENT
Start: 2024-03-29 | End: 2024-04-05

## 2024-03-30 NOTE — ED NOTES
Wound cleaned. Bandage applied. Educated pt and fly on reduction of friction and sheer, weight shifting and keeping wound clean.

## 2024-03-30 NOTE — ED PROVIDER NOTES
Encounter Date: 3/29/2024       History     Chief Complaint   Patient presents with    Skin Problem     Pt's family reports pt has a reddened spot to her left buttocks that family noticed yesterday while helping her shower; states yesterday the wound was bleeding and that they put Mike butt paste on the wound.      Angela, 84 year old female presents to the ER for evaluation of wound to the left buttock.  Pt states she fell 2 weeks ago.  Pt doesn't not remember landing on her left cheek.  Red, flat, non- draining 1.5 cm circular stage 3 decubitus. More related to friction.  Afebrile,  Denies any pain at present.  Pt states she lays in the bed or on the couch for most of the day.  Pt unable to verbalize aggravating or alleviating symptoms.            PMHX  Amnesia (finding)  Insomnia (disorder)  Anxiety disorder (disorder)  Monoplegia of lower limb (disorder)  Hyperlipidemia (disorder)  Primary osteoporosis (disorder)  Elevated blood-pressure reading without diagnosis of hypertension (finding)    NKDA  No known surgical history.          The history is provided by medical records and a relative. No  was used.     Review of patient's allergies indicates:  No Known Allergies  No past medical history on file.  No past surgical history on file.  No family history on file.     Review of Systems   Constitutional: Negative.    HENT: Negative.     Eyes: Negative.    Respiratory: Negative.     Cardiovascular: Negative.    Gastrointestinal: Negative.    Endocrine: Negative.    Genitourinary: Negative.    Musculoskeletal: Negative.    Skin:  Positive for wound.   Allergic/Immunologic: Negative.    Neurological: Negative.    Hematological: Negative.    Psychiatric/Behavioral: Negative.     All other systems reviewed and are negative.      Physical Exam     Initial Vitals [03/1940]   BP Pulse Resp Temp SpO2   (!) 145/79 93 18 98.1 °F (36.7 °C) 98 %      MAP       --         Physical Exam    Nursing  note and vitals reviewed.  Constitutional: She appears well-developed and well-nourished.   HENT:   Head: Normocephalic and atraumatic.   Right Ear: External ear normal.   Left Ear: External ear normal.   Nose: Nose normal.   Mouth/Throat: Oropharynx is clear and moist.   Eyes: Conjunctivae and EOM are normal. Pupils are equal, round, and reactive to light.   Neck: Neck supple.   Normal range of motion.  Cardiovascular:  Normal rate, regular rhythm, normal heart sounds and intact distal pulses.           Pulmonary/Chest: Breath sounds normal.   Abdominal: Abdomen is soft. Bowel sounds are normal.   Musculoskeletal:         General: Normal range of motion.      Cervical back: Normal range of motion and neck supple.     Neurological: She has normal strength. GCS score is 15. GCS eye subscore is 4. GCS verbal subscore is 5. GCS motor subscore is 6.   Pt follows commands.  Ambulates with a walker wheelchair.     Skin: Skin is warm. Capillary refill takes less than 2 seconds.   Stage 3 decubitus to the left buttock.    Psychiatric: She has a normal mood and affect. Her behavior is normal. Judgment and thought content normal.         ED Course   Procedures  Labs Reviewed - No data to display       Imaging Results    None          Medications - No data to display  Medical Decision Making  Medical Decision Making    Diff. Dx:  Cellulitis, Folliculitis, Wound Dehisce.     Discussed after care with Niece.  Keep wound clean and dry, avoid long periods of laying of site.  Follow up with wound care.  Verbalized understanding.                                       Clinical Impression:  Final diagnoses:  [E74.917R] Wound of left buttock, initial encounter (Primary)          ED Disposition Condition    Discharge Stable          ED Prescriptions       Medication Sig Dispense Start Date End Date Auth. Provider    clindamycin (CLEOCIN) 150 MG capsule Take 2 capsules (300 mg total) by mouth 4 (four) times daily. for 7 days 56 capsule  3/29/2024 4/5/2024 Eden Bowen NP    mupirocin (BACTROBAN) 2 % ointment Apply topically 3 (three) times daily. 1 g 3/29/2024 -- Eden Bowen NP          Follow-up Information       Follow up With Specialties Details Why Contact Info    Marquise Blair MD Family Medicine  As needed [address] [place] [id number]  [phone]               Eden Bowen NP  03/29/24 2017

## 2024-03-30 NOTE — DISCHARGE INSTRUCTIONS
Pt will be discharged home.  Keep wound clean and dressed.  Apply Bactroban  ointment with dressing change.  Take abx as directed.  Wound care referral placed.  Follow up as needed with PCP

## 2024-06-20 ENCOUNTER — LAB VISIT (OUTPATIENT)
Dept: LAB | Facility: HOSPITAL | Age: 84
End: 2024-06-20
Attending: FAMILY MEDICINE
Payer: COMMERCIAL

## 2024-06-20 DIAGNOSIS — F41.9 ANXIETY DISORDER OF CHILDHOOD OR ADOLESCENCE: ICD-10-CM

## 2024-06-20 DIAGNOSIS — E78.5 HYPERLIPIDEMIA, UNSPECIFIED HYPERLIPIDEMIA TYPE: ICD-10-CM

## 2024-06-20 DIAGNOSIS — G47.00 PERSISTENT DISORDER OF INITIATING OR MAINTAINING SLEEP: ICD-10-CM

## 2024-06-20 DIAGNOSIS — M25.519 PAIN IN JOINT, SHOULDER REGION: ICD-10-CM

## 2024-06-20 DIAGNOSIS — R41.3 MEMORY LOSS: ICD-10-CM

## 2024-06-20 DIAGNOSIS — R03.0 ELEVATED BLOOD PRESSURE READING WITHOUT DIAGNOSIS OF HYPERTENSION: ICD-10-CM

## 2024-06-20 DIAGNOSIS — J30.9 SPASMODIC RHINORRHEA: Primary | ICD-10-CM

## 2024-06-20 DIAGNOSIS — M54.50 LUMBAGO: ICD-10-CM

## 2024-06-20 DIAGNOSIS — R11.2 NAUSEA WITH VOMITING: ICD-10-CM

## 2024-06-20 DIAGNOSIS — K21.9 GASTROESOPHAGEAL REFLUX DISEASE, UNSPECIFIED WHETHER ESOPHAGITIS PRESENT: ICD-10-CM

## 2024-06-20 DIAGNOSIS — G83.10 MONOPLEGIA OF LOWER LIMB AFFECTING UNSPECIFIED SIDE: ICD-10-CM

## 2024-06-20 DIAGNOSIS — M25.559 PAIN IN JOINT INVOLVING PELVIC REGION AND THIGH, UNSPECIFIED LATERALITY: ICD-10-CM

## 2024-06-20 LAB
ALBUMIN SERPL-MCNC: 3.5 G/DL (ref 3.4–4.8)
ALBUMIN/GLOB SERPL: 1 RATIO (ref 1.1–2)
ALP SERPL-CCNC: 107 UNIT/L (ref 40–150)
ALT SERPL-CCNC: 20 UNIT/L (ref 0–55)
ANION GAP SERPL CALC-SCNC: 6 MEQ/L
AST SERPL-CCNC: 17 UNIT/L (ref 5–34)
BILIRUB SERPL-MCNC: 0.6 MG/DL
BUN SERPL-MCNC: 12.9 MG/DL (ref 9.8–20.1)
CALCIUM SERPL-MCNC: 9.4 MG/DL (ref 8.4–10.2)
CHLORIDE SERPL-SCNC: 110 MMOL/L (ref 98–107)
CHOLEST SERPL-MCNC: 204 MG/DL
CHOLEST/HDLC SERPL: 4 {RATIO} (ref 0–5)
CO2 SERPL-SCNC: 26 MMOL/L (ref 23–31)
CREAT SERPL-MCNC: 1.23 MG/DL (ref 0.55–1.02)
CREAT/UREA NIT SERPL: 10
ERYTHROCYTE [DISTWIDTH] IN BLOOD BY AUTOMATED COUNT: 14.3 % (ref 11.5–17)
GFR SERPLBLD CREATININE-BSD FMLA CKD-EPI: 43 ML/MIN/1.73/M2
GLOBULIN SER-MCNC: 3.5 GM/DL (ref 2.4–3.5)
GLUCOSE SERPL-MCNC: 110 MG/DL (ref 82–115)
HCT VFR BLD AUTO: 42.9 % (ref 37–47)
HDLC SERPL-MCNC: 57 MG/DL (ref 35–60)
HGB BLD-MCNC: 13.7 G/DL (ref 12–16)
LDLC SERPL CALC-MCNC: 117 MG/DL (ref 50–140)
MCH RBC QN AUTO: 29.8 PG (ref 27–31)
MCHC RBC AUTO-ENTMCNC: 31.9 G/DL (ref 33–36)
MCV RBC AUTO: 93.3 FL (ref 80–94)
PLATELET # BLD AUTO: 400 X10(3)/MCL (ref 130–400)
PMV BLD AUTO: 9.5 FL (ref 7.4–10.4)
POTASSIUM SERPL-SCNC: 4.5 MMOL/L (ref 3.5–5.1)
PROT SERPL-MCNC: 7 GM/DL (ref 5.8–7.6)
RBC # BLD AUTO: 4.6 X10(6)/MCL (ref 4.2–5.4)
SODIUM SERPL-SCNC: 142 MMOL/L (ref 136–145)
TRIGL SERPL-MCNC: 151 MG/DL (ref 37–140)
TSH SERPL-ACNC: 1.34 UIU/ML (ref 0.35–4.94)
VLDLC SERPL CALC-MCNC: 30 MG/DL
WBC # BLD AUTO: 10.34 X10(3)/MCL (ref 4.5–11.5)

## 2024-06-20 PROCEDURE — 36415 COLL VENOUS BLD VENIPUNCTURE: CPT

## 2024-06-20 PROCEDURE — 85027 COMPLETE CBC AUTOMATED: CPT

## 2024-06-20 PROCEDURE — 84443 ASSAY THYROID STIM HORMONE: CPT

## 2024-06-20 PROCEDURE — 80053 COMPREHEN METABOLIC PANEL: CPT

## 2024-06-20 PROCEDURE — 80061 LIPID PANEL: CPT

## 2024-06-21 ENCOUNTER — APPOINTMENT (OUTPATIENT)
Dept: LAB | Facility: HOSPITAL | Age: 84
End: 2024-06-21
Attending: FAMILY MEDICINE
Payer: COMMERCIAL

## 2024-06-21 DIAGNOSIS — G47.00 PERSISTENT DISORDER OF INITIATING OR MAINTAINING SLEEP: ICD-10-CM

## 2024-06-21 DIAGNOSIS — R11.2 NAUSEA WITH VOMITING: Primary | ICD-10-CM

## 2024-06-21 DIAGNOSIS — R41.3 MEMORY LOSS: ICD-10-CM

## 2024-06-21 LAB
BACTERIA #/AREA URNS AUTO: ABNORMAL /HPF
BILIRUB UR QL STRIP.AUTO: NEGATIVE
CLARITY UR: ABNORMAL
COLOR UR AUTO: YELLOW
GLUCOSE UR QL STRIP: NEGATIVE
HGB UR QL STRIP: ABNORMAL
KETONES UR QL STRIP: NEGATIVE
LEUKOCYTE ESTERASE UR QL STRIP: ABNORMAL
NITRITE UR QL STRIP: POSITIVE
PH UR STRIP: 6.5 [PH]
PROT UR QL STRIP: NEGATIVE
RBC #/AREA URNS AUTO: ABNORMAL /HPF
SP GR UR STRIP.AUTO: 1.02 (ref 1–1.03)
SQUAMOUS #/AREA URNS AUTO: ABNORMAL /HPF
UROBILINOGEN UR STRIP-ACNC: 1
WBC #/AREA URNS AUTO: ABNORMAL /HPF

## 2024-06-21 PROCEDURE — 87186 SC STD MICRODIL/AGAR DIL: CPT

## 2024-06-21 PROCEDURE — 81003 URINALYSIS AUTO W/O SCOPE: CPT

## 2024-06-21 PROCEDURE — 87086 URINE CULTURE/COLONY COUNT: CPT

## 2024-06-24 LAB — BACTERIA UR CULT: ABNORMAL

## 2024-11-08 DIAGNOSIS — Z78.0 MENOPAUSE PRESENT: Primary | ICD-10-CM

## 2024-12-30 DIAGNOSIS — Z78.0 POSTMENOPAUSAL: Primary | ICD-10-CM
